# Patient Record
Sex: MALE | Race: WHITE | NOT HISPANIC OR LATINO | Employment: FULL TIME | URBAN - METROPOLITAN AREA
[De-identification: names, ages, dates, MRNs, and addresses within clinical notes are randomized per-mention and may not be internally consistent; named-entity substitution may affect disease eponyms.]

---

## 2019-01-01 ENCOUNTER — EXTERNAL RECORD (OUTPATIENT)
Dept: OTHER | Age: 64
End: 2019-01-01

## 2019-07-25 ENCOUNTER — TELEPHONE (OUTPATIENT)
Dept: SCHEDULING | Age: 64
End: 2019-07-25

## 2019-08-09 ENCOUNTER — OFFICE VISIT (OUTPATIENT)
Dept: PAIN MANAGEMENT | Age: 64
End: 2019-08-09

## 2019-08-09 DIAGNOSIS — M47.816 LUMBAR SPONDYLOSIS: Primary | ICD-10-CM

## 2019-08-09 PROCEDURE — 99214 OFFICE O/P EST MOD 30 MIN: CPT | Performed by: ANESTHESIOLOGY

## 2019-08-09 RX ORDER — LOSARTAN POTASSIUM 100 MG/1
TABLET ORAL
COMMUNITY
Start: 2019-04-13

## 2019-08-09 RX ORDER — ATORVASTATIN CALCIUM 20 MG/1
TABLET, FILM COATED ORAL
COMMUNITY
Start: 2019-04-13

## 2019-08-09 RX ORDER — MELOXICAM 15 MG/1
TABLET ORAL
COMMUNITY
End: 2019-09-09

## 2019-08-09 RX ORDER — CELECOXIB 200 MG/1
200 CAPSULE ORAL DAILY
Qty: 30 CAPSULE | Refills: 1 | Status: SHIPPED | OUTPATIENT
Start: 2019-08-09 | End: 2019-10-13 | Stop reason: SDUPTHER

## 2019-08-09 RX ORDER — AMLODIPINE BESYLATE 5 MG/1
5 TABLET ORAL DAILY
COMMUNITY

## 2019-08-09 ASSESSMENT — ENCOUNTER SYMPTOMS
BACK PAIN: 1
NEUROLOGICAL NEGATIVE: 1
ALLERGIC/IMMUNOLOGIC NEGATIVE: 1
PSYCHIATRIC NEGATIVE: 1
EYES NEGATIVE: 1
RESPIRATORY NEGATIVE: 1
ENDOCRINE NEGATIVE: 1
GASTROINTESTINAL NEGATIVE: 1
CONSTITUTIONAL NEGATIVE: 1
HEMATOLOGIC/LYMPHATIC NEGATIVE: 1

## 2019-08-09 ASSESSMENT — PAIN SCALES - GENERAL: PAINLEVEL: 5-6

## 2019-08-28 ENCOUNTER — APPOINTMENT (OUTPATIENT)
Dept: PAIN MANAGEMENT | Age: 64
End: 2019-08-28

## 2019-08-28 ASSESSMENT — ENCOUNTER SYMPTOMS
ENDOCRINE NEGATIVE: 1
EYES NEGATIVE: 1
GASTROINTESTINAL NEGATIVE: 1
HEMATOLOGIC/LYMPHATIC NEGATIVE: 1
NEUROLOGICAL NEGATIVE: 1
BACK PAIN: 1
RESPIRATORY NEGATIVE: 1
ALLERGIC/IMMUNOLOGIC NEGATIVE: 1
PSYCHIATRIC NEGATIVE: 1
CONSTITUTIONAL NEGATIVE: 1

## 2019-09-09 ENCOUNTER — OFFICE VISIT (OUTPATIENT)
Dept: PAIN MANAGEMENT | Age: 64
End: 2019-09-09

## 2019-09-09 DIAGNOSIS — M47.816 LUMBAR SPONDYLOSIS: Primary | ICD-10-CM

## 2019-09-09 PROBLEM — M54.16 LUMBAR RADICULOPATHY: Status: ACTIVE | Noted: 2019-09-09

## 2019-09-09 PROCEDURE — 99213 OFFICE O/P EST LOW 20 MIN: CPT | Performed by: ANESTHESIOLOGY

## 2019-09-09 RX ORDER — ALPRAZOLAM 0.5 MG/1
1 TABLET ORAL PRN
COMMUNITY
Start: 2019-02-26

## 2019-09-09 RX ORDER — METHYLPREDNISOLONE 4 MG
TABLET, DOSE PACK ORAL
Qty: 21 TABLET | Refills: 0 | Status: SHIPPED | OUTPATIENT
Start: 2019-09-09 | End: 2019-11-01 | Stop reason: ALTCHOICE

## 2019-09-09 ASSESSMENT — ENCOUNTER SYMPTOMS
NEUROLOGICAL NEGATIVE: 1
RESPIRATORY NEGATIVE: 1
PSYCHIATRIC NEGATIVE: 1
BACK PAIN: 1
EYES NEGATIVE: 1
GASTROINTESTINAL NEGATIVE: 1
ENDOCRINE NEGATIVE: 1
HEMATOLOGIC/LYMPHATIC NEGATIVE: 1
ALLERGIC/IMMUNOLOGIC NEGATIVE: 1
CONSTITUTIONAL NEGATIVE: 1

## 2019-09-09 ASSESSMENT — PAIN SCALES - GENERAL: PAINLEVEL: 5-6

## 2019-10-01 PROCEDURE — 62323 NJX INTERLAMINAR LMBR/SAC: CPT | Performed by: ANESTHESIOLOGY

## 2019-10-02 ENCOUNTER — HOSPITAL (OUTPATIENT)
Dept: OTHER | Age: 64
End: 2019-10-02
Attending: ANESTHESIOLOGY

## 2019-10-13 DIAGNOSIS — M47.816 LUMBAR SPONDYLOSIS: ICD-10-CM

## 2019-10-14 RX ORDER — CELECOXIB 200 MG/1
CAPSULE ORAL
Qty: 30 CAPSULE | Refills: 1 | Status: SHIPPED | OUTPATIENT
Start: 2019-10-14 | End: 2019-12-12 | Stop reason: SDUPTHER

## 2019-10-16 ENCOUNTER — APPOINTMENT (OUTPATIENT)
Dept: PAIN MANAGEMENT | Age: 64
End: 2019-10-16

## 2019-11-01 ENCOUNTER — OFFICE VISIT (OUTPATIENT)
Dept: PAIN MANAGEMENT | Age: 64
End: 2019-11-01

## 2019-11-01 ENCOUNTER — TELEPHONE (OUTPATIENT)
Dept: PAIN MANAGEMENT | Age: 64
End: 2019-11-01

## 2019-11-01 DIAGNOSIS — M54.16 LUMBAR RADICULOPATHY: Primary | ICD-10-CM

## 2019-11-01 PROCEDURE — 99213 OFFICE O/P EST LOW 20 MIN: CPT | Performed by: ANESTHESIOLOGY

## 2019-11-01 RX ORDER — FLUTICASONE PROPIONATE 0.05 %
CREAM (GRAM) TOPICAL
Refills: 1 | COMMUNITY
Start: 2019-07-30 | End: 2020-09-14 | Stop reason: CLARIF

## 2019-11-01 RX ORDER — INDOMETHACIN 50 MG/1
50 CAPSULE ORAL PRN
Refills: 0 | COMMUNITY
Start: 2019-09-20 | End: 2020-09-14 | Stop reason: CLARIF

## 2019-11-01 RX ORDER — CLOBETASOL PROPIONATE 0.46 MG/ML
SOLUTION TOPICAL
COMMUNITY
Start: 2019-10-30 | End: 2020-09-14 | Stop reason: CLARIF

## 2019-11-01 RX ORDER — METHYLPREDNISOLONE 4 MG
TABLET, DOSE PACK ORAL
Qty: 21 TABLET | Refills: 0 | Status: SHIPPED | OUTPATIENT
Start: 2019-11-01 | End: 2019-11-11 | Stop reason: SDUPTHER

## 2019-11-01 RX ORDER — CYCLOBENZAPRINE HCL 5 MG
TABLET ORAL
Refills: 0 | COMMUNITY
Start: 2019-07-30 | End: 2020-09-14 | Stop reason: CLARIF

## 2019-11-01 ASSESSMENT — PAIN SCALES - GENERAL: PAINLEVEL: 7-8

## 2019-11-01 ASSESSMENT — ENCOUNTER SYMPTOMS: BACK PAIN: 1

## 2019-11-09 DIAGNOSIS — M47.816 LUMBAR SPONDYLOSIS: ICD-10-CM

## 2019-11-11 ENCOUNTER — TELEPHONE (OUTPATIENT)
Dept: SCHEDULING | Age: 64
End: 2019-11-11

## 2019-11-11 DIAGNOSIS — M54.16 LUMBAR RADICULOPATHY: ICD-10-CM

## 2019-11-11 RX ORDER — CELECOXIB 200 MG/1
CAPSULE ORAL
Qty: 30 CAPSULE | Refills: 1 | OUTPATIENT
Start: 2019-11-11

## 2019-11-12 RX ORDER — METHYLPREDNISOLONE 4 MG/1
TABLET ORAL
Qty: 1 PACKET | Refills: 0 | Status: SHIPPED | OUTPATIENT
Start: 2019-11-12 | End: 2020-09-14 | Stop reason: CLARIF

## 2019-11-16 ENCOUNTER — TELEPHONE (OUTPATIENT)
Dept: SCHEDULING | Age: 64
End: 2019-11-16

## 2019-11-19 ENCOUNTER — OFFICE VISIT (OUTPATIENT)
Dept: PAIN MANAGEMENT | Age: 64
End: 2019-11-19

## 2019-11-19 ENCOUNTER — PREP FOR CASE (OUTPATIENT)
Dept: PAIN MANAGEMENT | Age: 64
End: 2019-11-19

## 2019-11-19 VITALS
WEIGHT: 199 LBS | SYSTOLIC BLOOD PRESSURE: 162 MMHG | DIASTOLIC BLOOD PRESSURE: 101 MMHG | HEIGHT: 72 IN | HEART RATE: 81 BPM | BODY MASS INDEX: 26.95 KG/M2

## 2019-11-19 DIAGNOSIS — M54.16 LUMBAR RADICULOPATHY: Primary | ICD-10-CM

## 2019-11-19 DIAGNOSIS — M51.36 DDD (DEGENERATIVE DISC DISEASE), LUMBAR: Primary | ICD-10-CM

## 2019-11-19 PROCEDURE — 99214 OFFICE O/P EST MOD 30 MIN: CPT | Performed by: PHYSICIAN ASSISTANT

## 2019-12-09 ENCOUNTER — TELEPHONE (OUTPATIENT)
Dept: PAIN MANAGEMENT | Age: 64
End: 2019-12-09

## 2019-12-10 ENCOUNTER — TELEPHONE (OUTPATIENT)
Dept: PAIN MANAGEMENT | Age: 64
End: 2019-12-10

## 2019-12-12 ENCOUNTER — HOSPITAL ENCOUNTER (OUTPATIENT)
Dept: GENERAL RADIOLOGY | Age: 64
Discharge: HOME | End: 2019-12-12
Attending: ANESTHESIOLOGY

## 2019-12-12 ENCOUNTER — HOSPITAL ENCOUNTER (OUTPATIENT)
Dept: PAIN MANAGEMENT | Age: 64
Discharge: HOME | End: 2019-12-12
Attending: ANESTHESIOLOGY

## 2019-12-12 VITALS
OXYGEN SATURATION: 96 % | RESPIRATION RATE: 17 BRPM | DIASTOLIC BLOOD PRESSURE: 95 MMHG | TEMPERATURE: 98.8 F | HEART RATE: 102 BPM | SYSTOLIC BLOOD PRESSURE: 175 MMHG

## 2019-12-12 DIAGNOSIS — M47.816 LUMBAR SPONDYLOSIS: ICD-10-CM

## 2019-12-12 DIAGNOSIS — M54.16 LUMBAR RADICULOPATHY: ICD-10-CM

## 2019-12-12 DIAGNOSIS — R52 PAIN: ICD-10-CM

## 2019-12-12 PROCEDURE — 76000 FLUOROSCOPY <1 HR PHYS/QHP: CPT

## 2019-12-12 PROCEDURE — 10002801 HB RX 250 W/O HCPCS: Performed by: ANESTHESIOLOGY

## 2019-12-12 PROCEDURE — 13000067 HB PAIN MANAGEMENT GROUP 2

## 2019-12-12 PROCEDURE — 10002805 HB CONTRAST AGENT: Performed by: ANESTHESIOLOGY

## 2019-12-12 PROCEDURE — 62323 NJX INTERLAMINAR LMBR/SAC: CPT | Performed by: ANESTHESIOLOGY

## 2019-12-12 RX ORDER — CELECOXIB 200 MG/1
CAPSULE ORAL
Qty: 30 CAPSULE | Refills: 1 | Status: SHIPPED | OUTPATIENT
Start: 2019-12-12 | End: 2020-01-19 | Stop reason: SDUPTHER

## 2019-12-12 RX ORDER — BUPIVACAINE HYDROCHLORIDE 2.5 MG/ML
3 INJECTION, SOLUTION EPIDURAL; INFILTRATION; INTRACAUDAL ONCE
Status: COMPLETED | OUTPATIENT
Start: 2019-12-12 | End: 2019-12-12

## 2019-12-12 RX ORDER — METHYLPREDNISOLONE ACETATE 80 MG/ML
80 INJECTION, SUSPENSION INTRA-ARTICULAR; INTRALESIONAL; INTRAMUSCULAR; SOFT TISSUE ONCE
Status: COMPLETED | OUTPATIENT
Start: 2019-12-12 | End: 2019-12-12

## 2019-12-12 RX ADMIN — BUPIVACAINE HYDROCHLORIDE 7.5 MG: 2.5 INJECTION, SOLUTION EPIDURAL; INFILTRATION; INTRACAUDAL; PERINEURAL at 11:53

## 2019-12-12 RX ADMIN — METHYLPREDNISOLONE ACETATE 80 MG: 80 INJECTION, SUSPENSION INTRA-ARTICULAR; INTRALESIONAL; INTRAMUSCULAR; SOFT TISSUE at 11:54

## 2019-12-12 RX ADMIN — IOHEXOL 0.5 ML: 300 INJECTION, SOLUTION INTRAVENOUS at 11:54

## 2019-12-12 ASSESSMENT — PAIN SCALES - GENERAL
PAINLEVEL_OUTOF10: 9
PAINLEVEL_OUTOF10: 2

## 2019-12-18 ENCOUNTER — APPOINTMENT (OUTPATIENT)
Dept: PAIN MANAGEMENT | Age: 64
End: 2019-12-18

## 2020-01-01 ENCOUNTER — EXTERNAL RECORD (OUTPATIENT)
Dept: OTHER | Age: 65
End: 2020-01-01

## 2020-01-01 ENCOUNTER — EXTERNAL RECORD (OUTPATIENT)
Dept: HEALTH INFORMATION MANAGEMENT | Facility: OTHER | Age: 65
End: 2020-01-01

## 2020-01-03 ENCOUNTER — PREP FOR CASE (OUTPATIENT)
Dept: PAIN MANAGEMENT | Age: 65
End: 2020-01-03

## 2020-01-03 ENCOUNTER — OFFICE VISIT (OUTPATIENT)
Dept: PAIN MANAGEMENT | Age: 65
End: 2020-01-03

## 2020-01-03 DIAGNOSIS — M54.16 LUMBAR RADICULOPATHY: ICD-10-CM

## 2020-01-03 DIAGNOSIS — M51.36 DDD (DEGENERATIVE DISC DISEASE), LUMBAR: Primary | ICD-10-CM

## 2020-01-03 DIAGNOSIS — M54.16 LUMBAR RADICULOPATHY: Primary | ICD-10-CM

## 2020-01-03 PROCEDURE — 99214 OFFICE O/P EST MOD 30 MIN: CPT | Performed by: PHYSICIAN ASSISTANT

## 2020-01-03 ASSESSMENT — PAIN SCALES - GENERAL: PAINLEVEL: 1-2

## 2020-01-09 ENCOUNTER — HOSPITAL ENCOUNTER (OUTPATIENT)
Dept: PAIN MANAGEMENT | Age: 65
Discharge: HOME | End: 2020-01-09
Attending: ANESTHESIOLOGY

## 2020-01-09 ENCOUNTER — HOSPITAL ENCOUNTER (OUTPATIENT)
Dept: GENERAL RADIOLOGY | Age: 65
Discharge: HOME | End: 2020-01-09
Attending: ANESTHESIOLOGY

## 2020-01-09 VITALS
TEMPERATURE: 98.6 F | RESPIRATION RATE: 16 BRPM | OXYGEN SATURATION: 95 % | DIASTOLIC BLOOD PRESSURE: 95 MMHG | SYSTOLIC BLOOD PRESSURE: 150 MMHG | HEART RATE: 90 BPM

## 2020-01-09 DIAGNOSIS — M54.16 LUMBAR RADICULOPATHY: ICD-10-CM

## 2020-01-09 DIAGNOSIS — R52 PAIN: ICD-10-CM

## 2020-01-09 PROCEDURE — 10002805 HB CONTRAST AGENT: Performed by: ANESTHESIOLOGY

## 2020-01-09 PROCEDURE — 76000 FLUOROSCOPY <1 HR PHYS/QHP: CPT

## 2020-01-09 PROCEDURE — 64483 NJX AA&/STRD TFRM EPI L/S 1: CPT | Performed by: ANESTHESIOLOGY

## 2020-01-09 PROCEDURE — 10002801 HB RX 250 W/O HCPCS: Performed by: ANESTHESIOLOGY

## 2020-01-09 PROCEDURE — 13000067 HB PAIN MANAGEMENT GROUP 2

## 2020-01-09 RX ORDER — METHYLPREDNISOLONE ACETATE 80 MG/ML
80 INJECTION, SUSPENSION INTRA-ARTICULAR; INTRALESIONAL; INTRAMUSCULAR; SOFT TISSUE ONCE
Status: COMPLETED | OUTPATIENT
Start: 2020-01-09 | End: 2020-01-09

## 2020-01-09 RX ORDER — BUPIVACAINE HYDROCHLORIDE 2.5 MG/ML
2 INJECTION, SOLUTION EPIDURAL; INFILTRATION; INTRACAUDAL ONCE
Status: COMPLETED | OUTPATIENT
Start: 2020-01-09 | End: 2020-01-09

## 2020-01-09 RX ADMIN — METHYLPREDNISOLONE ACETATE 80 MG: 80 INJECTION, SUSPENSION INTRA-ARTICULAR; INTRALESIONAL; INTRAMUSCULAR; SOFT TISSUE at 10:18

## 2020-01-09 RX ADMIN — IOHEXOL 0.5 ML: 300 INJECTION, SOLUTION INTRAVENOUS at 10:18

## 2020-01-09 RX ADMIN — BUPIVACAINE HYDROCHLORIDE 5 MG: 2.5 INJECTION, SOLUTION EPIDURAL; INFILTRATION; INTRACAUDAL; PERINEURAL at 10:19

## 2020-01-09 ASSESSMENT — PAIN SCALES - GENERAL
PAINLEVEL_OUTOF10: 5
PAINLEVEL_OUTOF10: 0

## 2020-01-09 ASSESSMENT — LIFESTYLE VARIABLES
HOW OFTEN DO YOU HAVE A DRINK CONTAINING ALCOHOL: 4 OR MORE TIMES PER WEEK
HOW MANY STANDARD DRINKS CONTAINING ALCOHOL DO YOU HAVE ON A TYPICAL DAY: 0,1 OR 2
AUDIT-C TOTAL SCORE: 4

## 2020-01-13 ENCOUNTER — TELEPHONE (OUTPATIENT)
Dept: PAIN MANAGEMENT | Age: 65
End: 2020-01-13

## 2020-01-19 DIAGNOSIS — M47.816 LUMBAR SPONDYLOSIS: ICD-10-CM

## 2020-01-19 RX ORDER — CELECOXIB 200 MG/1
CAPSULE ORAL
Qty: 30 CAPSULE | Refills: 1 | Status: SHIPPED | OUTPATIENT
Start: 2020-01-19 | End: 2020-11-18 | Stop reason: SDUPTHER

## 2020-01-28 ENCOUNTER — OFFICE VISIT (OUTPATIENT)
Dept: PAIN MANAGEMENT | Age: 65
End: 2020-01-28

## 2020-01-28 DIAGNOSIS — M51.36 DDD (DEGENERATIVE DISC DISEASE), LUMBAR: Primary | ICD-10-CM

## 2020-01-28 DIAGNOSIS — M54.16 LUMBAR RADICULOPATHY: ICD-10-CM

## 2020-01-28 PROCEDURE — 99214 OFFICE O/P EST MOD 30 MIN: CPT | Performed by: PHYSICIAN ASSISTANT

## 2020-01-28 ASSESSMENT — PAIN SCALES - GENERAL: PAINLEVEL: 1-2

## 2020-02-17 ENCOUNTER — TELEPHONE (OUTPATIENT)
Dept: PAIN MANAGEMENT | Age: 65
End: 2020-02-17

## 2020-03-09 ENCOUNTER — TELEPHONE (OUTPATIENT)
Dept: PAIN MANAGEMENT | Age: 65
End: 2020-03-09

## 2020-03-09 PROBLEM — M54.16 LUMBAR RADICULOPATHY: Status: ACTIVE | Noted: 2019-09-09

## 2020-03-09 PROBLEM — M47.816 LUMBAR SPONDYLOSIS: Status: ACTIVE | Noted: 2019-08-09

## 2020-03-09 PROBLEM — M51.36 DDD (DEGENERATIVE DISC DISEASE), LUMBAR: Status: ACTIVE | Noted: 2019-11-19

## 2020-03-12 ENCOUNTER — ORDER TRANSCRIPTION (OUTPATIENT)
Dept: ADMINISTRATIVE | Facility: HOSPITAL | Age: 65
End: 2020-03-12

## 2020-03-12 VITALS — WEIGHT: 200 LBS | BODY MASS INDEX: 27.09 KG/M2 | HEIGHT: 72 IN

## 2020-03-12 DIAGNOSIS — M54.9 BACK PAIN: Primary | ICD-10-CM

## 2020-03-12 NOTE — IMAGING NOTE
Spoke with patient regarding arrival time, NPO status, LOS,  and med review. Must hold all anticoagulants, NSAIDs and herbal supplements in preparation for procedure.

## 2020-03-16 ENCOUNTER — HOSPITAL ENCOUNTER (OUTPATIENT)
Dept: GENERAL RADIOLOGY | Facility: HOSPITAL | Age: 65
Discharge: HOME | End: 2020-03-16
Attending: ORTHOPAEDIC SURGERY
Payer: MEDICARE

## 2020-03-16 ENCOUNTER — HOSPITAL ENCOUNTER (OUTPATIENT)
Dept: CT IMAGING | Facility: HOSPITAL | Age: 65
Discharge: HOME | End: 2020-03-16
Attending: ORTHOPAEDIC SURGERY
Payer: MEDICARE

## 2020-03-16 ENCOUNTER — HOSPITAL ENCOUNTER (OUTPATIENT)
Dept: GENERAL RADIOLOGY | Facility: HOSPITAL | Age: 65
End: 2020-03-16
Attending: ORTHOPAEDIC SURGERY
Payer: MEDICARE

## 2020-03-18 VITALS — HEIGHT: 72 IN | WEIGHT: 200 LBS | BODY MASS INDEX: 27.09 KG/M2

## 2020-03-30 NOTE — IMAGING NOTE
Spoke with patient about Myelogram scheduled for 03/31 wishes to cancel appointment. Xray aware procedure needs to be canceled.

## 2020-03-31 ENCOUNTER — HOSPITAL ENCOUNTER (OUTPATIENT)
Dept: CT IMAGING | Facility: HOSPITAL | Age: 65
Discharge: HOME | End: 2020-03-31
Attending: ORTHOPAEDIC SURGERY
Payer: MEDICARE

## 2020-03-31 ENCOUNTER — HOSPITAL ENCOUNTER (OUTPATIENT)
Dept: GENERAL RADIOLOGY | Facility: HOSPITAL | Age: 65
Discharge: HOME | End: 2020-03-31
Attending: ORTHOPAEDIC SURGERY
Payer: MEDICARE

## 2020-07-17 ENCOUNTER — TELEPHONE (OUTPATIENT)
Dept: PAIN MANAGEMENT | Age: 65
End: 2020-07-17

## 2020-07-17 DIAGNOSIS — M51.36 DDD (DEGENERATIVE DISC DISEASE), LUMBAR: Primary | ICD-10-CM

## 2020-08-13 PROBLEM — R73.01 IFG (IMPAIRED FASTING GLUCOSE): Status: ACTIVE | Noted: 2020-04-01

## 2020-08-24 ENCOUNTER — TELEPHONE (OUTPATIENT)
Dept: SCHEDULING | Age: 65
End: 2020-08-24

## 2020-09-03 ENCOUNTER — TELEPHONE (OUTPATIENT)
Dept: PAIN MANAGEMENT | Age: 65
End: 2020-09-03

## 2020-09-03 DIAGNOSIS — M54.16 LUMBAR RADICULOPATHY: Primary | ICD-10-CM

## 2020-09-14 ENCOUNTER — OFFICE VISIT (OUTPATIENT)
Dept: PAIN MANAGEMENT | Age: 65
End: 2020-09-14

## 2020-09-14 DIAGNOSIS — M51.36 DDD (DEGENERATIVE DISC DISEASE), LUMBAR: Primary | ICD-10-CM

## 2020-09-14 DIAGNOSIS — M47.816 LUMBAR SPONDYLOSIS: ICD-10-CM

## 2020-09-14 PROCEDURE — 99214 OFFICE O/P EST MOD 30 MIN: CPT | Performed by: PHYSICIAN ASSISTANT

## 2020-09-14 RX ORDER — FLUTICASONE PROPIONATE 50 MCG
SPRAY, SUSPENSION (ML) NASAL
COMMUNITY
End: 2022-06-01

## 2020-09-14 RX ORDER — METHYLPREDNISOLONE 4 MG/1
4 TABLET ORAL SEE ADMIN INSTRUCTIONS
Qty: 21 TABLET | Refills: 1 | Status: SHIPPED | OUTPATIENT
Start: 2020-09-14 | End: 2020-11-18 | Stop reason: CLARIF

## 2020-09-14 RX ORDER — IBUPROFEN 600 MG/1
600 TABLET ORAL 3 TIMES DAILY
COMMUNITY
Start: 2020-09-01 | End: 2022-05-03

## 2020-09-14 RX ORDER — NAPROXEN 500 MG/1
TABLET ORAL
COMMUNITY
End: 2022-02-24

## 2020-09-14 ASSESSMENT — PAIN SCALES - GENERAL: PAINLEVEL: 1-2

## 2020-09-21 ENCOUNTER — APPOINTMENT (OUTPATIENT)
Dept: PAIN MANAGEMENT | Age: 65
End: 2020-09-21

## 2020-10-21 ENCOUNTER — TELEPHONE (OUTPATIENT)
Dept: PAIN MANAGEMENT | Age: 65
End: 2020-10-21

## 2020-10-21 RX ORDER — METHYLPREDNISOLONE 4 MG/1
4 TABLET ORAL SEE ADMIN INSTRUCTIONS
Qty: 21 TABLET | Refills: 0 | Status: SHIPPED | OUTPATIENT
Start: 2020-10-21 | End: 2020-11-18 | Stop reason: CLARIF

## 2020-11-18 ENCOUNTER — OFFICE VISIT (OUTPATIENT)
Dept: PAIN MANAGEMENT | Age: 65
End: 2020-11-18

## 2020-11-18 DIAGNOSIS — M47.814 SPONDYLOSIS OF THORACIC REGION WITHOUT MYELOPATHY OR RADICULOPATHY: Primary | ICD-10-CM

## 2020-11-18 DIAGNOSIS — M51.36 DDD (DEGENERATIVE DISC DISEASE), LUMBAR: ICD-10-CM

## 2020-11-18 DIAGNOSIS — M47.816 LUMBAR SPONDYLOSIS: ICD-10-CM

## 2020-11-18 PROCEDURE — 99214 OFFICE O/P EST MOD 30 MIN: CPT | Performed by: PHYSICIAN ASSISTANT

## 2020-11-18 RX ORDER — CELECOXIB 200 MG/1
200 CAPSULE ORAL DAILY
Qty: 30 CAPSULE | Refills: 3 | Status: SHIPPED | OUTPATIENT
Start: 2020-11-18 | End: 2022-02-25 | Stop reason: SDUPTHER

## 2020-11-18 RX ORDER — DOXYCYCLINE HYCLATE 100 MG
100 TABLET ORAL 2 TIMES DAILY WITH MEALS
COMMUNITY
Start: 2020-11-12 | End: 2021-01-11

## 2020-11-18 ASSESSMENT — PAIN SCALES - GENERAL: PAINLEVEL: 1-2

## 2020-11-24 ENCOUNTER — TELEPHONE (OUTPATIENT)
Dept: PAIN MANAGEMENT | Age: 65
End: 2020-11-24

## 2020-12-21 PROBLEM — M47.814 SPONDYLOSIS OF THORACIC REGION WITHOUT MYELOPATHY OR RADICULOPATHY: Status: ACTIVE | Noted: 2020-11-18

## 2020-12-23 ENCOUNTER — TELEPHONE (OUTPATIENT)
Dept: PAIN MANAGEMENT | Age: 65
End: 2020-12-23

## 2020-12-28 ENCOUNTER — TELEPHONE (OUTPATIENT)
Dept: SCHEDULING | Age: 65
End: 2020-12-28

## 2020-12-28 RX ORDER — METHYLPREDNISOLONE 4 MG/1
4 TABLET ORAL SEE ADMIN INSTRUCTIONS
Qty: 21 TABLET | Refills: 0 | Status: SHIPPED | OUTPATIENT
Start: 2020-12-28 | End: 2022-02-02

## 2021-01-01 ENCOUNTER — EXTERNAL RECORD (OUTPATIENT)
Dept: HEALTH INFORMATION MANAGEMENT | Facility: OTHER | Age: 66
End: 2021-01-01

## 2021-01-05 ENCOUNTER — APPOINTMENT (OUTPATIENT)
Dept: PAIN MANAGEMENT | Age: 66
End: 2021-01-05

## 2021-01-06 ENCOUNTER — APPOINTMENT (OUTPATIENT)
Dept: PAIN MANAGEMENT | Age: 66
End: 2021-01-06

## 2021-01-07 ENCOUNTER — APPOINTMENT (OUTPATIENT)
Dept: PAIN MANAGEMENT | Age: 66
End: 2021-01-07

## 2021-01-11 ENCOUNTER — OFFICE VISIT (OUTPATIENT)
Dept: PAIN MANAGEMENT | Age: 66
End: 2021-01-11

## 2021-01-11 ENCOUNTER — TELEPHONE (OUTPATIENT)
Dept: PAIN MANAGEMENT | Age: 66
End: 2021-01-11

## 2021-01-11 DIAGNOSIS — M54.16 LUMBAR RADICULOPATHY: Primary | ICD-10-CM

## 2021-01-11 PROCEDURE — 99214 OFFICE O/P EST MOD 30 MIN: CPT | Performed by: ANESTHESIOLOGY

## 2021-01-11 RX ORDER — COLCHICINE 0.6 MG/1
0.6 CAPSULE ORAL 3 TIMES DAILY
COMMUNITY
Start: 2020-12-07 | End: 2022-05-03

## 2021-01-11 RX ORDER — METHYLPREDNISOLONE 4 MG
TABLET, DOSE PACK ORAL
Qty: 21 TABLET | Refills: 0 | Status: SHIPPED | OUTPATIENT
Start: 2021-01-11 | End: 2022-02-02

## 2021-01-11 RX ORDER — DICLOFENAC SODIUM 75 MG/1
75 TABLET, DELAYED RELEASE ORAL 2 TIMES DAILY
Qty: 60 TABLET | Refills: 1 | Status: SHIPPED | OUTPATIENT
Start: 2021-01-11 | End: 2021-03-24

## 2021-01-11 RX ORDER — ALLOPURINOL 300 MG/1
300 TABLET ORAL AT BEDTIME
COMMUNITY
Start: 2020-12-21

## 2021-01-11 RX ORDER — DOXYCYCLINE HYCLATE 100 MG/1
100 CAPSULE ORAL 2 TIMES DAILY WITH MEALS
COMMUNITY
Start: 2020-12-22 | End: 2022-02-02

## 2021-01-11 ASSESSMENT — PAIN SCALES - GENERAL: PAINLEVEL: 1-2

## 2021-01-13 ENCOUNTER — TELEPHONE (OUTPATIENT)
Dept: PAIN MANAGEMENT | Age: 66
End: 2021-01-13

## 2021-01-21 ENCOUNTER — TELEPHONE (OUTPATIENT)
Dept: PAIN MANAGEMENT | Age: 66
End: 2021-01-21

## 2021-01-21 ENCOUNTER — E-ADVICE (OUTPATIENT)
Dept: PAIN MANAGEMENT | Age: 66
End: 2021-01-21

## 2021-01-22 ENCOUNTER — APPOINTMENT (OUTPATIENT)
Dept: PAIN MANAGEMENT | Age: 66
End: 2021-01-22

## 2021-01-26 ASSESSMENT — ENCOUNTER SYMPTOMS: BACK PAIN: 1

## 2021-03-09 ENCOUNTER — TELEPHONE (OUTPATIENT)
Dept: PAIN MANAGEMENT | Age: 66
End: 2021-03-09

## 2021-03-10 ENCOUNTER — APPOINTMENT (OUTPATIENT)
Dept: PAIN MANAGEMENT | Age: 66
End: 2021-03-10

## 2021-03-24 DIAGNOSIS — M54.16 LUMBAR RADICULOPATHY: ICD-10-CM

## 2021-03-24 RX ORDER — DICLOFENAC SODIUM 75 MG/1
TABLET, DELAYED RELEASE ORAL
Qty: 60 TABLET | Refills: 1 | Status: SHIPPED | OUTPATIENT
Start: 2021-03-24 | End: 2022-02-24

## 2021-05-18 ENCOUNTER — TELEPHONE (OUTPATIENT)
Dept: PAIN MANAGEMENT | Age: 66
End: 2021-05-18

## 2021-05-26 VITALS
HEIGHT: 72 IN | DIASTOLIC BLOOD PRESSURE: 93 MMHG | SYSTOLIC BLOOD PRESSURE: 146 MMHG | BODY MASS INDEX: 27.08 KG/M2 | SYSTOLIC BLOOD PRESSURE: 149 MMHG | HEART RATE: 80 BPM | SYSTOLIC BLOOD PRESSURE: 158 MMHG | HEART RATE: 92 BPM | SYSTOLIC BLOOD PRESSURE: 150 MMHG | WEIGHT: 199 LBS | SYSTOLIC BLOOD PRESSURE: 152 MMHG | DIASTOLIC BLOOD PRESSURE: 105 MMHG | TEMPERATURE: 98.4 F | HEIGHT: 72 IN | DIASTOLIC BLOOD PRESSURE: 93 MMHG | TEMPERATURE: 97.9 F | OXYGEN SATURATION: 97 % | WEIGHT: 200 LBS | DIASTOLIC BLOOD PRESSURE: 88 MMHG | BODY MASS INDEX: 26.95 KG/M2 | WEIGHT: 199.96 LBS | SYSTOLIC BLOOD PRESSURE: 142 MMHG | HEIGHT: 72 IN | SYSTOLIC BLOOD PRESSURE: 160 MMHG | HEIGHT: 72 IN | BODY MASS INDEX: 27.08 KG/M2 | BODY MASS INDEX: 26.95 KG/M2 | HEART RATE: 85 BPM | WEIGHT: 199.96 LBS | HEART RATE: 85 BPM | HEIGHT: 72 IN | OXYGEN SATURATION: 99 % | DIASTOLIC BLOOD PRESSURE: 86 MMHG | WEIGHT: 199 LBS | HEART RATE: 115 BPM | WEIGHT: 200 LBS | WEIGHT: 199.96 LBS | HEART RATE: 86 BPM | DIASTOLIC BLOOD PRESSURE: 102 MMHG | DIASTOLIC BLOOD PRESSURE: 88 MMHG | HEIGHT: 72 IN | BODY MASS INDEX: 27.08 KG/M2 | HEART RATE: 85 BPM | BODY MASS INDEX: 27.09 KG/M2 | HEIGHT: 72 IN | BODY MASS INDEX: 27.09 KG/M2 | OXYGEN SATURATION: 95 % | SYSTOLIC BLOOD PRESSURE: 156 MMHG | HEART RATE: 82 BPM | DIASTOLIC BLOOD PRESSURE: 89 MMHG

## 2022-01-01 ENCOUNTER — EXTERNAL RECORD (OUTPATIENT)
Dept: OTHER | Age: 67
End: 2022-01-01

## 2022-01-12 PROBLEM — M47.816 ARTHRITIS OF FACET JOINT OF LUMBAR SPINE: Status: ACTIVE | Noted: 2022-01-12

## 2022-01-12 PROBLEM — M54.14 THORACIC RADICULITIS: Status: ACTIVE | Noted: 2022-01-12

## 2022-01-12 PROBLEM — Z98.890 HISTORY OF LUMBAR LAMINECTOMY: Status: ACTIVE | Noted: 2022-01-12

## 2022-01-12 PROBLEM — M51.24 THORACIC DISC HERNIATION: Status: ACTIVE | Noted: 2022-01-12

## 2022-01-12 PROBLEM — M48.061 SPINAL STENOSIS OF LUMBAR REGION, UNSPECIFIED WHETHER NEUROGENIC CLAUDICATION PRESENT: Status: ACTIVE | Noted: 2022-01-12

## 2022-02-02 ENCOUNTER — OFFICE VISIT (OUTPATIENT)
Dept: PAIN MANAGEMENT | Age: 67
End: 2022-02-02

## 2022-02-02 ENCOUNTER — PREP FOR CASE (OUTPATIENT)
Dept: PAIN MANAGEMENT | Age: 67
End: 2022-02-02

## 2022-02-02 VITALS
HEIGHT: 72 IN | SYSTOLIC BLOOD PRESSURE: 136 MMHG | HEART RATE: 81 BPM | BODY MASS INDEX: 27.08 KG/M2 | DIASTOLIC BLOOD PRESSURE: 90 MMHG | WEIGHT: 199.96 LBS

## 2022-02-02 DIAGNOSIS — M47.816 LUMBAR SPONDYLOSIS: ICD-10-CM

## 2022-02-02 DIAGNOSIS — M54.14 THORACIC RADICULOPATHY: ICD-10-CM

## 2022-02-02 DIAGNOSIS — M47.816 LUMBAR SPONDYLOSIS: Primary | ICD-10-CM

## 2022-02-02 DIAGNOSIS — M51.36 DDD (DEGENERATIVE DISC DISEASE), LUMBAR: Primary | ICD-10-CM

## 2022-02-02 PROCEDURE — 99214 OFFICE O/P EST MOD 30 MIN: CPT | Performed by: ANESTHESIOLOGY

## 2022-02-02 RX ORDER — CLINDAMYCIN PHOSPHATE 11.9 MG/ML
SOLUTION TOPICAL
COMMUNITY
Start: 2021-12-08 | End: 2022-02-24 | Stop reason: SDUPTHER

## 2022-02-02 RX ORDER — BENZOYL PEROXIDE 100 MG/ML
LIQUID TOPICAL
COMMUNITY
Start: 2022-01-11

## 2022-02-02 RX ORDER — MINOCYCLINE HYDROCHLORIDE 100 MG/1
100 TABLET ORAL 2 TIMES DAILY
COMMUNITY
Start: 2021-12-08 | End: 2022-05-03

## 2022-02-02 RX ORDER — CICLOPIROX 1 G/100ML
SHAMPOO TOPICAL
COMMUNITY
Start: 2021-09-21 | End: 2022-05-03 | Stop reason: CLARIF

## 2022-02-02 RX ORDER — KETOCONAZOLE 20 MG/ML
SHAMPOO TOPICAL
COMMUNITY
Start: 2022-01-11 | End: 2022-05-03

## 2022-02-02 RX ORDER — HYDROXYCHLOROQUINE SULFATE 200 MG/1
TABLET, FILM COATED ORAL
COMMUNITY
Start: 2022-01-17 | End: 2022-02-24

## 2022-02-02 RX ORDER — CLINDAMYCIN PHOSPHATE 11.9 MG/ML
SOLUTION TOPICAL 2 TIMES DAILY
COMMUNITY
Start: 2021-09-29 | End: 2022-05-03

## 2022-02-02 RX ORDER — CLOBETASOL PROPIONATE 0.5 MG/G
OINTMENT TOPICAL
COMMUNITY
Start: 2021-10-28 | End: 2022-05-03

## 2022-02-02 ASSESSMENT — PAIN SCALES - GENERAL: PAINLEVEL: 1

## 2022-02-02 ASSESSMENT — ENCOUNTER SYMPTOMS: BACK PAIN: 1

## 2022-02-04 ENCOUNTER — TELEPHONE (OUTPATIENT)
Dept: PAIN MANAGEMENT | Age: 67
End: 2022-02-04

## 2022-02-04 RX ORDER — CYCLOBENZAPRINE HCL 10 MG
10 TABLET ORAL
Qty: 30 TABLET | Refills: 0 | Status: SHIPPED | OUTPATIENT
Start: 2022-02-04 | End: 2022-12-16 | Stop reason: SDUPTHER

## 2022-02-05 ENCOUNTER — TELEPHONE (OUTPATIENT)
Dept: SCHEDULING | Age: 67
End: 2022-02-05

## 2022-02-05 RX ORDER — CYCLOBENZAPRINE HCL 10 MG
10 TABLET ORAL
Qty: 30 TABLET | Refills: 0 | Status: CANCELLED | OUTPATIENT
Start: 2022-02-05

## 2022-02-21 ENCOUNTER — TELEPHONE (OUTPATIENT)
Dept: PAIN MANAGEMENT | Age: 67
End: 2022-02-21

## 2022-02-23 ENCOUNTER — TELEPHONE (OUTPATIENT)
Dept: PAIN MANAGEMENT | Age: 67
End: 2022-02-23

## 2022-02-24 ENCOUNTER — HOSPITAL ENCOUNTER (OUTPATIENT)
Dept: GENERAL RADIOLOGY | Age: 67
Discharge: HOME | End: 2022-02-24
Attending: ANESTHESIOLOGY

## 2022-02-24 ENCOUNTER — HOSPITAL ENCOUNTER (OUTPATIENT)
Dept: PAIN MANAGEMENT | Age: 67
Discharge: HOME | End: 2022-02-24
Attending: ANESTHESIOLOGY

## 2022-02-24 VITALS
OXYGEN SATURATION: 98 % | SYSTOLIC BLOOD PRESSURE: 153 MMHG | TEMPERATURE: 99 F | RESPIRATION RATE: 12 BRPM | DIASTOLIC BLOOD PRESSURE: 86 MMHG | HEART RATE: 69 BPM

## 2022-02-24 DIAGNOSIS — R52 PAIN: ICD-10-CM

## 2022-02-24 DIAGNOSIS — M47.816 LUMBAR SPONDYLOSIS: ICD-10-CM

## 2022-02-24 PROCEDURE — 13000068 HB PAIN MANAGEMENT GROUP 3

## 2022-02-24 PROCEDURE — 64493 INJ PARAVERT F JNT L/S 1 LEV: CPT | Performed by: ANESTHESIOLOGY

## 2022-02-24 PROCEDURE — 62321 NJX INTERLAMINAR CRV/THRC: CPT | Performed by: ANESTHESIOLOGY

## 2022-02-24 PROCEDURE — 10002805 HB CONTRAST AGENT: Performed by: ANESTHESIOLOGY

## 2022-02-24 PROCEDURE — 76000 FLUOROSCOPY <1 HR PHYS/QHP: CPT

## 2022-02-24 PROCEDURE — 64494 INJ PARAVERT F JNT L/S 2 LEV: CPT | Performed by: ANESTHESIOLOGY

## 2022-02-24 PROCEDURE — 64495 INJ PARAVERT F JNT L/S 3 LEV: CPT | Performed by: ANESTHESIOLOGY

## 2022-02-24 PROCEDURE — 10002801 HB RX 250 W/O HCPCS: Performed by: ANESTHESIOLOGY

## 2022-02-24 RX ORDER — MELOXICAM 15 MG/1
15 TABLET ORAL DAILY
COMMUNITY
End: 2022-05-03

## 2022-02-24 RX ORDER — BUPIVACAINE HYDROCHLORIDE 2.5 MG/ML
4.5 INJECTION, SOLUTION EPIDURAL; INFILTRATION; INTRACAUDAL ONCE
Status: COMPLETED | OUTPATIENT
Start: 2022-02-24 | End: 2022-02-24

## 2022-02-24 RX ORDER — METHYLPREDNISOLONE ACETATE 80 MG/ML
80 INJECTION, SUSPENSION INTRA-ARTICULAR; INTRALESIONAL; INTRAMUSCULAR; SOFT TISSUE ONCE
Status: COMPLETED | OUTPATIENT
Start: 2022-02-24 | End: 2022-02-24

## 2022-02-24 RX ORDER — BUPIVACAINE HYDROCHLORIDE 2.5 MG/ML
3 INJECTION, SOLUTION EPIDURAL; INFILTRATION; INTRACAUDAL ONCE
Status: DISCONTINUED | OUTPATIENT
Start: 2022-02-24 | End: 2022-02-24

## 2022-02-24 RX ADMIN — IOHEXOL 0.5 ML: 300 INJECTION, SOLUTION INTRAVENOUS at 13:38

## 2022-02-24 RX ADMIN — METHYLPREDNISOLONE ACETATE 80 MG: 80 INJECTION, SUSPENSION INTRA-ARTICULAR; INTRALESIONAL; INTRAMUSCULAR; SOFT TISSUE at 13:39

## 2022-02-24 RX ADMIN — BUPIVACAINE HYDROCHLORIDE 11.25 MG: 2.5 INJECTION, SOLUTION EPIDURAL; INFILTRATION; INTRACAUDAL; PERINEURAL at 13:37

## 2022-02-24 RX ADMIN — IOHEXOL 0.5 ML: 300 INJECTION, SOLUTION INTRAVENOUS at 13:43

## 2022-02-24 ASSESSMENT — PAIN SCALES - GENERAL
PAINLEVEL_OUTOF10: 2
PAINLEVEL_OUTOF10: 0

## 2022-02-25 ENCOUNTER — PREP FOR CASE (OUTPATIENT)
Dept: PAIN MANAGEMENT | Age: 67
End: 2022-02-25

## 2022-02-25 ENCOUNTER — TELEPHONE (OUTPATIENT)
Dept: PAIN MANAGEMENT | Age: 67
End: 2022-02-25

## 2022-02-25 DIAGNOSIS — M47.816 LUMBAR SPONDYLOSIS: ICD-10-CM

## 2022-02-25 DIAGNOSIS — M47.816 LUMBAR SPONDYLOSIS: Primary | ICD-10-CM

## 2022-02-25 RX ORDER — CELECOXIB 200 MG/1
200 CAPSULE ORAL DAILY
Qty: 30 CAPSULE | Refills: 3 | Status: SHIPPED | OUTPATIENT
Start: 2022-02-25 | End: 2022-07-11

## 2022-02-28 ENCOUNTER — TELEPHONE (OUTPATIENT)
Dept: PAIN MANAGEMENT | Age: 67
End: 2022-02-28

## 2022-03-12 DIAGNOSIS — M47.816 LUMBAR SPONDYLOSIS: ICD-10-CM

## 2022-03-14 RX ORDER — CELECOXIB 200 MG/1
CAPSULE ORAL
Qty: 30 CAPSULE | Refills: 0 | OUTPATIENT
Start: 2022-03-14

## 2022-03-19 PROBLEM — Z87.19 HISTORY OF RECTAL FISSURE: Status: ACTIVE | Noted: 2022-01-01

## 2022-03-23 ENCOUNTER — TELEPHONE (OUTPATIENT)
Dept: PAIN MANAGEMENT | Age: 67
End: 2022-03-23

## 2022-03-24 ENCOUNTER — HOSPITAL ENCOUNTER (OUTPATIENT)
Dept: PAIN MANAGEMENT | Age: 67
Discharge: HOME | End: 2022-03-24
Attending: ANESTHESIOLOGY

## 2022-03-24 ENCOUNTER — HOSPITAL ENCOUNTER (OUTPATIENT)
Dept: GENERAL RADIOLOGY | Age: 67
Discharge: HOME | End: 2022-03-24
Attending: ANESTHESIOLOGY

## 2022-03-24 VITALS
OXYGEN SATURATION: 99 % | DIASTOLIC BLOOD PRESSURE: 97 MMHG | RESPIRATION RATE: 16 BRPM | SYSTOLIC BLOOD PRESSURE: 167 MMHG | TEMPERATURE: 99 F | HEART RATE: 79 BPM

## 2022-03-24 DIAGNOSIS — R52 PAIN: ICD-10-CM

## 2022-03-24 DIAGNOSIS — M47.816 LUMBAR SPONDYLOSIS: ICD-10-CM

## 2022-03-24 PROCEDURE — 64495 INJ PARAVERT F JNT L/S 3 LEV: CPT | Performed by: ANESTHESIOLOGY

## 2022-03-24 PROCEDURE — 64493 INJ PARAVERT F JNT L/S 1 LEV: CPT | Performed by: ANESTHESIOLOGY

## 2022-03-24 PROCEDURE — 10002801 HB RX 250 W/O HCPCS: Performed by: ANESTHESIOLOGY

## 2022-03-24 PROCEDURE — 76000 FLUOROSCOPY <1 HR PHYS/QHP: CPT

## 2022-03-24 PROCEDURE — 64494 INJ PARAVERT F JNT L/S 2 LEV: CPT | Performed by: ANESTHESIOLOGY

## 2022-03-24 PROCEDURE — 62321 NJX INTERLAMINAR CRV/THRC: CPT | Performed by: ANESTHESIOLOGY

## 2022-03-24 PROCEDURE — 10002805 HB CONTRAST AGENT: Performed by: ANESTHESIOLOGY

## 2022-03-24 PROCEDURE — 13000068 HB PAIN MANAGEMENT GROUP 3

## 2022-03-24 RX ORDER — METHYLPREDNISOLONE ACETATE 80 MG/ML
80 INJECTION, SUSPENSION INTRA-ARTICULAR; INTRALESIONAL; INTRAMUSCULAR; SOFT TISSUE ONCE
Status: COMPLETED | OUTPATIENT
Start: 2022-03-24 | End: 2022-03-24

## 2022-03-24 RX ORDER — BUPIVACAINE HYDROCHLORIDE 2.5 MG/ML
1.5 INJECTION, SOLUTION EPIDURAL; INFILTRATION; INTRACAUDAL ONCE
Status: COMPLETED | OUTPATIENT
Start: 2022-03-24 | End: 2022-03-24

## 2022-03-24 RX ORDER — BUPIVACAINE HYDROCHLORIDE 2.5 MG/ML
3 INJECTION, SOLUTION EPIDURAL; INFILTRATION; INTRACAUDAL ONCE
Status: COMPLETED | OUTPATIENT
Start: 2022-03-24 | End: 2022-03-24

## 2022-03-24 RX ORDER — METHYLPREDNISOLONE ACETATE 80 MG/ML
80 INJECTION, SUSPENSION INTRA-ARTICULAR; INTRALESIONAL; INTRAMUSCULAR; SOFT TISSUE ONCE
Status: DISCONTINUED | OUTPATIENT
Start: 2022-03-24 | End: 2022-03-24

## 2022-03-24 RX ADMIN — BUPIVACAINE HYDROCHLORIDE 3.75 MG: 2.5 INJECTION, SOLUTION EPIDURAL; INFILTRATION; INTRACAUDAL; PERINEURAL at 14:02

## 2022-03-24 RX ADMIN — METHYLPREDNISOLONE ACETATE 80 MG: 80 INJECTION, SUSPENSION INTRA-ARTICULAR; INTRALESIONAL; INTRAMUSCULAR; SOFT TISSUE at 14:01

## 2022-03-24 RX ADMIN — BUPIVACAINE HYDROCHLORIDE 7.5 MG: 2.5 INJECTION, SOLUTION EPIDURAL; INFILTRATION; INTRACAUDAL; PERINEURAL at 13:59

## 2022-03-24 RX ADMIN — IOHEXOL 0.5 ML: 300 INJECTION, SOLUTION INTRAVENOUS at 13:59

## 2022-03-24 RX ADMIN — IOHEXOL 0.5 ML: 300 INJECTION, SOLUTION INTRAVENOUS at 14:03

## 2022-03-24 ASSESSMENT — PAIN SCALES - GENERAL
PAINLEVEL_OUTOF10: 1
PAINLEVEL_OUTOF10: 3

## 2022-03-25 ENCOUNTER — TELEPHONE (OUTPATIENT)
Dept: PAIN MANAGEMENT | Age: 67
End: 2022-03-25

## 2022-03-25 DIAGNOSIS — Z01.812 PRE-PROCEDURAL LABORATORY EXAMINATION: Primary | ICD-10-CM

## 2022-03-28 ENCOUNTER — PREP FOR CASE (OUTPATIENT)
Dept: PAIN MANAGEMENT | Age: 67
End: 2022-03-28

## 2022-03-28 DIAGNOSIS — M47.816 LUMBAR SPONDYLOSIS: Primary | ICD-10-CM

## 2022-04-12 ENCOUNTER — APPOINTMENT (OUTPATIENT)
Dept: CARDIOLOGY | Age: 67
End: 2022-04-12
Attending: ANESTHESIOLOGY

## 2022-04-19 ENCOUNTER — PREP FOR CASE (OUTPATIENT)
Dept: PAIN MANAGEMENT | Age: 67
End: 2022-04-19

## 2022-04-19 DIAGNOSIS — M47.816 LUMBAR SPONDYLOSIS: Primary | ICD-10-CM

## 2022-04-28 ENCOUNTER — TELEPHONE (OUTPATIENT)
Dept: CARDIOLOGY | Age: 67
End: 2022-04-28

## 2022-05-01 ENCOUNTER — LAB SERVICES (OUTPATIENT)
Dept: LAB | Age: 67
End: 2022-05-01

## 2022-05-01 DIAGNOSIS — Z01.812 PRE-PROCEDURAL LABORATORY EXAMINATION: ICD-10-CM

## 2022-05-01 LAB
SARS-COV-2 RNA RESP QL NAA+PROBE: NOT DETECTED
SERVICE CMNT-IMP: NORMAL
SERVICE CMNT-IMP: NORMAL

## 2022-05-01 PROCEDURE — U0005 INFEC AGEN DETEC AMPLI PROBE: HCPCS

## 2022-05-02 ENCOUNTER — TELEPHONE (OUTPATIENT)
Dept: PAIN MANAGEMENT | Age: 67
End: 2022-05-02

## 2022-05-03 ENCOUNTER — HOSPITAL ENCOUNTER (OUTPATIENT)
Dept: CARDIOLOGY | Age: 67
Discharge: HOME | End: 2022-05-03
Attending: ANESTHESIOLOGY

## 2022-05-03 VITALS
DIASTOLIC BLOOD PRESSURE: 101 MMHG | HEART RATE: 85 BPM | SYSTOLIC BLOOD PRESSURE: 156 MMHG | BODY MASS INDEX: 27.09 KG/M2 | WEIGHT: 200 LBS | OXYGEN SATURATION: 94 % | TEMPERATURE: 98 F | RESPIRATION RATE: 13 BRPM | HEIGHT: 72 IN

## 2022-05-03 DIAGNOSIS — R52 PAIN: ICD-10-CM

## 2022-05-03 PROCEDURE — C2618 PROBE/NEEDLE, CRYO: HCPCS

## 2022-05-03 PROCEDURE — 64636 DESTROY L/S FACET JNT ADDL: CPT | Performed by: ANESTHESIOLOGY

## 2022-05-03 PROCEDURE — 99152 MOD SED SAME PHYS/QHP 5/>YRS: CPT

## 2022-05-03 PROCEDURE — 10006023 HB SUPPLY 272

## 2022-05-03 PROCEDURE — 99152 MOD SED SAME PHYS/QHP 5/>YRS: CPT | Performed by: ANESTHESIOLOGY

## 2022-05-03 PROCEDURE — 10002801 HB RX 250 W/O HCPCS: Performed by: ANESTHESIOLOGY

## 2022-05-03 PROCEDURE — 64635 DESTROY LUMB/SAC FACET JNT: CPT | Performed by: ANESTHESIOLOGY

## 2022-05-03 PROCEDURE — 76000 FLUOROSCOPY <1 HR PHYS/QHP: CPT

## 2022-05-03 PROCEDURE — 13000068 HB PAIN MANAGEMENT GROUP 3

## 2022-05-03 PROCEDURE — 10002800 HB RX 250 W HCPCS: Performed by: ANESTHESIOLOGY

## 2022-05-03 RX ORDER — BUPIVACAINE HYDROCHLORIDE 2.5 MG/ML
INJECTION, SOLUTION EPIDURAL; INFILTRATION; INTRACAUDAL PRN
Status: COMPLETED | OUTPATIENT
Start: 2022-05-03 | End: 2022-05-03

## 2022-05-03 RX ORDER — MIDAZOLAM HYDROCHLORIDE 1 MG/ML
INJECTION, SOLUTION INTRAMUSCULAR; INTRAVENOUS PRN
Status: COMPLETED | OUTPATIENT
Start: 2022-05-03 | End: 2022-05-03

## 2022-05-03 RX ORDER — METHYLPREDNISOLONE ACETATE 80 MG/ML
INJECTION, SUSPENSION INTRA-ARTICULAR; INTRALESIONAL; INTRAMUSCULAR; SOFT TISSUE PRN
Status: COMPLETED | OUTPATIENT
Start: 2022-05-03 | End: 2022-05-03

## 2022-05-03 RX ORDER — LIDOCAINE HYDROCHLORIDE 10 MG/ML
INJECTION, SOLUTION INFILTRATION; PERINEURAL PRN
Status: COMPLETED | OUTPATIENT
Start: 2022-05-03 | End: 2022-05-03

## 2022-05-03 RX ADMIN — FENTANYL CITRATE 50 MCG: 50 INJECTION, SOLUTION INTRAMUSCULAR; INTRAVENOUS at 14:08

## 2022-05-03 RX ADMIN — LIDOCAINE HYDROCHLORIDE 10 ML: 10 INJECTION, SOLUTION INFILTRATION; PERINEURAL at 14:16

## 2022-05-03 RX ADMIN — MIDAZOLAM HYDROCHLORIDE 1 MG: 1 INJECTION, SOLUTION INTRAMUSCULAR; INTRAVENOUS at 14:15

## 2022-05-03 RX ADMIN — MIDAZOLAM HYDROCHLORIDE 1 MG: 1 INJECTION, SOLUTION INTRAMUSCULAR; INTRAVENOUS at 14:25

## 2022-05-03 RX ADMIN — METHYLPREDNISOLONE ACETATE 80 MG: 80 INJECTION, SUSPENSION INTRA-ARTICULAR; INTRALESIONAL; INTRAMUSCULAR; SOFT TISSUE at 14:23

## 2022-05-03 RX ADMIN — MIDAZOLAM HYDROCHLORIDE 1 MG: 1 INJECTION, SOLUTION INTRAMUSCULAR; INTRAVENOUS at 14:09

## 2022-05-03 RX ADMIN — MIDAZOLAM HYDROCHLORIDE 3 MG: 1 INJECTION, SOLUTION INTRAMUSCULAR; INTRAVENOUS at 14:08

## 2022-05-03 RX ADMIN — MIDAZOLAM HYDROCHLORIDE 1 MG: 1 INJECTION, SOLUTION INTRAMUSCULAR; INTRAVENOUS at 14:21

## 2022-05-03 RX ADMIN — BUPIVACAINE HYDROCHLORIDE 10 ML: 2.5 INJECTION, SOLUTION EPIDURAL; INFILTRATION; INTRACAUDAL at 14:22

## 2022-05-03 RX ADMIN — FENTANYL CITRATE 50 MCG: 50 INJECTION, SOLUTION INTRAMUSCULAR; INTRAVENOUS at 14:15

## 2022-05-06 ENCOUNTER — TELEPHONE (OUTPATIENT)
Dept: PAIN MANAGEMENT | Age: 67
End: 2022-05-06

## 2022-05-27 ENCOUNTER — TELEPHONE (OUTPATIENT)
Dept: PAIN MANAGEMENT | Age: 67
End: 2022-05-27

## 2022-06-01 ENCOUNTER — OFFICE VISIT (OUTPATIENT)
Dept: PAIN MANAGEMENT | Age: 67
End: 2022-06-01

## 2022-06-01 VITALS
SYSTOLIC BLOOD PRESSURE: 154 MMHG | HEART RATE: 70 BPM | DIASTOLIC BLOOD PRESSURE: 85 MMHG | WEIGHT: 199 LBS | HEIGHT: 72 IN | BODY MASS INDEX: 26.95 KG/M2

## 2022-06-01 DIAGNOSIS — M47.816 LUMBAR SPONDYLOSIS: Primary | ICD-10-CM

## 2022-06-01 PROCEDURE — 99214 OFFICE O/P EST MOD 30 MIN: CPT | Performed by: ANESTHESIOLOGY

## 2022-06-01 ASSESSMENT — ENCOUNTER SYMPTOMS: BACK PAIN: 1

## 2022-06-01 ASSESSMENT — PAIN SCALES - GENERAL: PAINLEVEL: 1

## 2022-06-24 ENCOUNTER — TELEPHONE (OUTPATIENT)
Dept: SCHEDULING | Age: 67
End: 2022-06-24

## 2022-06-24 ENCOUNTER — TELEPHONE (OUTPATIENT)
Dept: PAIN MANAGEMENT | Age: 67
End: 2022-06-24

## 2022-07-06 ENCOUNTER — TELEPHONE (OUTPATIENT)
Dept: PAIN MANAGEMENT | Age: 67
End: 2022-07-06

## 2022-07-10 DIAGNOSIS — M47.816 LUMBAR SPONDYLOSIS: ICD-10-CM

## 2022-07-11 RX ORDER — CELECOXIB 200 MG/1
200 CAPSULE ORAL DAILY
Qty: 30 CAPSULE | Refills: 3 | Status: SHIPPED | OUTPATIENT
Start: 2022-07-11 | End: 2022-11-22

## 2022-09-14 ENCOUNTER — TELEPHONE (OUTPATIENT)
Dept: PAIN MANAGEMENT | Age: 67
End: 2022-09-14

## 2022-09-14 DIAGNOSIS — M54.16 LUMBAR RADICULOPATHY: Primary | ICD-10-CM

## 2022-09-16 ENCOUNTER — EXTERNAL RECORD (OUTPATIENT)
Dept: HEALTH INFORMATION MANAGEMENT | Facility: OTHER | Age: 67
End: 2022-09-16

## 2022-09-28 ENCOUNTER — E-ADVICE (OUTPATIENT)
Dept: PAIN MANAGEMENT | Age: 67
End: 2022-09-28

## 2022-09-28 DIAGNOSIS — Z01.812 PRE-PROCEDURAL LABORATORY EXAMINATIONS: Primary | ICD-10-CM

## 2022-10-01 ENCOUNTER — LAB SERVICES (OUTPATIENT)
Dept: LAB | Age: 67
End: 2022-10-01

## 2022-10-01 DIAGNOSIS — Z01.812 PRE-PROCEDURAL LABORATORY EXAMINATIONS: ICD-10-CM

## 2022-10-01 PROCEDURE — U0005 INFEC AGEN DETEC AMPLI PROBE: HCPCS

## 2022-10-02 ENCOUNTER — TELEPHONE (OUTPATIENT)
Dept: CARDIOLOGY | Age: 67
End: 2022-10-02

## 2022-10-02 LAB
SARS-COV-2 RNA RESP QL NAA+PROBE: NOT DETECTED
SERVICE CMNT-IMP: NORMAL
SERVICE CMNT-IMP: NORMAL

## 2022-10-03 ENCOUNTER — ANESTHESIA EVENT (OUTPATIENT)
Dept: MRI IMAGING | Age: 67
End: 2022-10-03

## 2022-10-03 ENCOUNTER — HOSPITAL ENCOUNTER (OUTPATIENT)
Dept: MRI IMAGING | Age: 67
Discharge: HOME | End: 2022-10-03
Attending: PHYSICIAN ASSISTANT

## 2022-10-03 ENCOUNTER — APPOINTMENT (OUTPATIENT)
Dept: MRI IMAGING | Age: 67
End: 2022-10-03
Attending: PHYSICIAN ASSISTANT

## 2022-10-03 ENCOUNTER — ANESTHESIA (OUTPATIENT)
Dept: MRI IMAGING | Age: 67
End: 2022-10-03

## 2022-10-03 VITALS
DIASTOLIC BLOOD PRESSURE: 84 MMHG | TEMPERATURE: 97.9 F | HEART RATE: 78 BPM | OXYGEN SATURATION: 96 % | SYSTOLIC BLOOD PRESSURE: 141 MMHG | RESPIRATION RATE: 18 BRPM

## 2022-10-03 DIAGNOSIS — M54.16 LUMBAR RADICULOPATHY: ICD-10-CM

## 2022-10-03 PROCEDURE — G1004 CDSM NDSC: HCPCS

## 2022-10-03 PROCEDURE — 13000001 HB PHASE II RECOVERY EA 30 MINUTES

## 2022-10-03 PROCEDURE — 10004451 HB PACU RECOVERY 1ST 30 MINUTES

## 2022-10-03 PROCEDURE — 72148 MRI LUMBAR SPINE W/O DYE: CPT

## 2022-10-03 PROCEDURE — 13000008 HB ANESTHESIA MAC OUTSIDE OR

## 2022-10-03 PROCEDURE — 10002801 HB RX 250 W/O HCPCS: Performed by: ANESTHESIOLOGY

## 2022-10-03 PROCEDURE — 10002807 HB RX 258: Performed by: ANESTHESIOLOGY

## 2022-10-03 PROCEDURE — 10002800 HB RX 250 W HCPCS: Performed by: ANESTHESIOLOGY

## 2022-10-03 RX ORDER — NALOXONE HCL 0.4 MG/ML
0.2 VIAL (ML) INJECTION EVERY 5 MIN PRN
Status: DISCONTINUED | OUTPATIENT
Start: 2022-10-03 | End: 2022-10-05 | Stop reason: HOSPADM

## 2022-10-03 RX ORDER — LIDOCAINE HYDROCHLORIDE 10 MG/ML
INJECTION, SOLUTION INFILTRATION; PERINEURAL PRN
Status: DISCONTINUED | OUTPATIENT
Start: 2022-10-03 | End: 2022-10-03

## 2022-10-03 RX ORDER — SODIUM CHLORIDE 9 MG/ML
INJECTION, SOLUTION INTRAVENOUS CONTINUOUS PRN
Status: DISCONTINUED | OUTPATIENT
Start: 2022-10-03 | End: 2022-10-03

## 2022-10-03 RX ORDER — PROPOFOL 10 MG/ML
INJECTION, EMULSION INTRAVENOUS PRN
Status: DISCONTINUED | OUTPATIENT
Start: 2022-10-03 | End: 2022-10-03

## 2022-10-03 RX ORDER — HYDRALAZINE HYDROCHLORIDE 20 MG/ML
5 INJECTION INTRAMUSCULAR; INTRAVENOUS EVERY 10 MIN PRN
Status: DISCONTINUED | OUTPATIENT
Start: 2022-10-03 | End: 2022-10-05 | Stop reason: HOSPADM

## 2022-10-03 RX ORDER — ONDANSETRON 2 MG/ML
4 INJECTION INTRAMUSCULAR; INTRAVENOUS 2 TIMES DAILY PRN
Status: DISCONTINUED | OUTPATIENT
Start: 2022-10-03 | End: 2022-10-05 | Stop reason: HOSPADM

## 2022-10-03 RX ORDER — MIDAZOLAM HYDROCHLORIDE 1 MG/ML
INJECTION, SOLUTION INTRAMUSCULAR; INTRAVENOUS PRN
Status: DISCONTINUED | OUTPATIENT
Start: 2022-10-03 | End: 2022-10-03

## 2022-10-03 RX ADMIN — PROPOFOL 150 MCG/KG/MIN: 10 INJECTION, EMULSION INTRAVENOUS at 13:12

## 2022-10-03 RX ADMIN — LIDOCAINE HYDROCHLORIDE 5 ML: 10 INJECTION, SOLUTION INFILTRATION; PERINEURAL at 13:10

## 2022-10-03 RX ADMIN — MIDAZOLAM HYDROCHLORIDE 2 MG: 1 INJECTION, SOLUTION INTRAMUSCULAR; INTRAVENOUS at 13:09

## 2022-10-03 RX ADMIN — PROPOFOL 50 MG: 10 INJECTION, EMULSION INTRAVENOUS at 13:10

## 2022-10-03 RX ADMIN — SODIUM CHLORIDE: 9 INJECTION, SOLUTION INTRAVENOUS at 13:10

## 2022-10-03 ASSESSMENT — PAIN SCALES - GENERAL
PAINLEVEL_OUTOF10: 0

## 2022-11-04 ENCOUNTER — CLINICAL ABSTRACT (OUTPATIENT)
Dept: HEALTH INFORMATION MANAGEMENT | Facility: OTHER | Age: 67
End: 2022-11-04

## 2022-11-07 ENCOUNTER — PREP FOR CASE (OUTPATIENT)
Dept: PAIN MANAGEMENT | Age: 67
End: 2022-11-07

## 2022-11-07 ENCOUNTER — OFFICE VISIT (OUTPATIENT)
Dept: PAIN MANAGEMENT | Age: 67
End: 2022-11-07

## 2022-11-07 VITALS
SYSTOLIC BLOOD PRESSURE: 142 MMHG | HEIGHT: 72 IN | DIASTOLIC BLOOD PRESSURE: 90 MMHG | BODY MASS INDEX: 26.96 KG/M2 | HEART RATE: 86 BPM | WEIGHT: 199.08 LBS

## 2022-11-07 DIAGNOSIS — M47.816 LUMBAR SPONDYLOSIS: ICD-10-CM

## 2022-11-07 DIAGNOSIS — M46.1 SACROILIITIS (CMD): Primary | ICD-10-CM

## 2022-11-07 DIAGNOSIS — M46.1 SACROILIITIS, NOT ELSEWHERE CLASSIFIED (CMD): Primary | ICD-10-CM

## 2022-11-07 PROCEDURE — 99214 OFFICE O/P EST MOD 30 MIN: CPT | Performed by: ANESTHESIOLOGY

## 2022-11-07 ASSESSMENT — PAIN SCALES - GENERAL: PAINLEVEL: 1

## 2022-11-08 ENCOUNTER — TELEPHONE (OUTPATIENT)
Dept: PAIN MANAGEMENT | Age: 67
End: 2022-11-08

## 2022-11-08 PROBLEM — M46.1 SACROILIITIS, NOT ELSEWHERE CLASSIFIED (CMD): Status: ACTIVE | Noted: 2022-11-08

## 2022-11-08 ASSESSMENT — ENCOUNTER SYMPTOMS: BACK PAIN: 1

## 2022-11-15 ENCOUNTER — HOSPITAL ENCOUNTER (OUTPATIENT)
Dept: PAIN MANAGEMENT | Age: 67
Discharge: HOME | End: 2022-11-15
Attending: ANESTHESIOLOGY

## 2022-11-15 ENCOUNTER — HOSPITAL ENCOUNTER (OUTPATIENT)
Dept: GENERAL RADIOLOGY | Age: 67
Discharge: HOME | End: 2022-11-15
Attending: ANESTHESIOLOGY

## 2022-11-15 VITALS
RESPIRATION RATE: 16 BRPM | TEMPERATURE: 99 F | DIASTOLIC BLOOD PRESSURE: 93 MMHG | HEART RATE: 73 BPM | SYSTOLIC BLOOD PRESSURE: 151 MMHG | OXYGEN SATURATION: 97 %

## 2022-11-15 DIAGNOSIS — R52 PAIN: ICD-10-CM

## 2022-11-15 DIAGNOSIS — M46.1 SACROILIITIS (CMD): ICD-10-CM

## 2022-11-15 PROCEDURE — 27096 INJECT SACROILIAC JOINT: CPT | Performed by: ANESTHESIOLOGY

## 2022-11-15 PROCEDURE — 13000067 HB PAIN MANAGEMENT GROUP 2

## 2022-11-15 PROCEDURE — 76000 FLUOROSCOPY <1 HR PHYS/QHP: CPT

## 2022-11-15 RX ORDER — BUPIVACAINE HYDROCHLORIDE 2.5 MG/ML
3 INJECTION, SOLUTION EPIDURAL; INFILTRATION; INTRACAUDAL ONCE
Status: DISCONTINUED | OUTPATIENT
Start: 2022-11-15 | End: 2022-11-17 | Stop reason: HOSPADM

## 2022-11-15 RX ORDER — METHYLPREDNISOLONE ACETATE 80 MG/ML
80 INJECTION, SUSPENSION INTRA-ARTICULAR; INTRALESIONAL; INTRAMUSCULAR; SOFT TISSUE ONCE
Status: DISCONTINUED | OUTPATIENT
Start: 2022-11-15 | End: 2022-11-17 | Stop reason: HOSPADM

## 2022-11-15 ASSESSMENT — PAIN SCALES - GENERAL
PAINLEVEL_OUTOF10: 0
PAINLEVEL_OUTOF10: 2

## 2022-11-22 DIAGNOSIS — M47.816 LUMBAR SPONDYLOSIS: ICD-10-CM

## 2022-11-22 RX ORDER — CELECOXIB 200 MG/1
200 CAPSULE ORAL DAILY
Qty: 30 CAPSULE | Refills: 3 | Status: SHIPPED | OUTPATIENT
Start: 2022-11-22 | End: 2022-12-16 | Stop reason: SDUPTHER

## 2022-12-16 ENCOUNTER — OFFICE VISIT (OUTPATIENT)
Dept: PAIN MANAGEMENT | Age: 67
End: 2022-12-16

## 2022-12-16 VITALS
HEIGHT: 72 IN | DIASTOLIC BLOOD PRESSURE: 96 MMHG | BODY MASS INDEX: 26.96 KG/M2 | SYSTOLIC BLOOD PRESSURE: 159 MMHG | WEIGHT: 199.08 LBS

## 2022-12-16 DIAGNOSIS — M46.1 SACROILIITIS, NOT ELSEWHERE CLASSIFIED (CMD): Primary | ICD-10-CM

## 2022-12-16 DIAGNOSIS — M47.816 LUMBAR SPONDYLOSIS: ICD-10-CM

## 2022-12-16 PROCEDURE — 99214 OFFICE O/P EST MOD 30 MIN: CPT | Performed by: ANESTHESIOLOGY

## 2022-12-16 RX ORDER — CYCLOBENZAPRINE HCL 10 MG
10 TABLET ORAL
Qty: 90 TABLET | Refills: 1 | Status: SHIPPED | OUTPATIENT
Start: 2022-12-16 | End: 2023-02-06 | Stop reason: SDUPTHER

## 2022-12-16 RX ORDER — CELECOXIB 200 MG/1
200 CAPSULE ORAL DAILY
Qty: 90 CAPSULE | Refills: 2 | Status: SHIPPED | OUTPATIENT
Start: 2022-12-16 | End: 2023-03-16

## 2022-12-16 ASSESSMENT — PAIN SCALES - GENERAL: PAINLEVEL: 2

## 2022-12-16 ASSESSMENT — ENCOUNTER SYMPTOMS: BACK PAIN: 1

## 2023-01-31 DIAGNOSIS — M47.816 LUMBAR SPONDYLOSIS: ICD-10-CM

## 2023-01-31 RX ORDER — CYCLOBENZAPRINE HCL 10 MG
10 TABLET ORAL
Qty: 90 TABLET | Refills: 1 | OUTPATIENT
Start: 2023-01-31 | End: 2023-05-01

## 2023-02-06 DIAGNOSIS — M47.816 LUMBAR SPONDYLOSIS: ICD-10-CM

## 2023-02-06 RX ORDER — CYCLOBENZAPRINE HCL 10 MG
10 TABLET ORAL
Qty: 90 TABLET | Refills: 1 | Status: CANCELLED | OUTPATIENT
Start: 2023-02-06 | End: 2023-05-07

## 2023-02-06 RX ORDER — CYCLOBENZAPRINE HCL 10 MG
10 TABLET ORAL
Qty: 90 TABLET | Refills: 1 | Status: SHIPPED | OUTPATIENT
Start: 2023-02-06 | End: 2023-05-07

## 2024-04-15 NOTE — DISCHARGE INSTRUCTIONS
Limit lifting right arm above shoulder for 3 days  May shower beginning 4/19/2024     HOME INSTRUCTIONS  [facility] HOME CARE INSTRUCTIONS: POST-OP ANESTHESIA  The medication that you received for sedation or general anesthesia can last up to 24 hours. Your judgment and reflexes may be altered, even if you feel like your normal self.      We Recommend:   Do not drive any motor vehicle or bicycle   Avoid mowing the lawn, playing sports, or working with power tools/applicances (power saws, electric knives or mixers)   That you have someone stay with you on your first night home   Do not drink alcohol or take sleeping pills or tranquilizers   Do not sign legal documents within 24 hours of your procedure   If you had a nerve block for your surgery, take extra care not to put any pressure on your arm or hand for 24 hours    It is normal:  For you to have a sore throat if you had a breathing tube during surgery (while you were asleep!). The sore throat should get better within 48 hours. You can gargle with warm salt water (1/2 tsp in 4 oz warm water) or use a throat lozenge for comfort  To feel muscle aches or soreness especially in the abdomen, chest or neck. The achy feeling should go away in the next 24 hours  To feel weak, sleepy or \"wiped out\". Your should start feeling better in the next 24 hours.   To experience mild discomforts such as sore lip or tongue, headache, cramps, gas pains or a bloated feeling in your abdomen.   To experience mild back pain or soreness for a day or two if you had spinal or epidural anesthesia.   If you had laparoscopic surgery, to feel shoulder pain or discomfort on the day of surgery.   For some patients to have nausea after surgery/anesthesia    If you feel nausea or experience vomiting:   Try to move around less.   Eat less than usual or drink only liquids until the next morning   Nausea should resolve in about 24 hours    If you have a problem when you are at home:    Call your surgeons  office   Discharge Instructions: After Your Surgery  You’ve just had surgery. During surgery, you were given medicine called anesthesia to keep you relaxed and free of pain. After surgery, you may have some pain or nausea. This is common. Here are some tips for feeling better and getting well after surgery.   Going home  Your healthcare provider will show you how to take care of yourself when you go home. They'll also answer your questions. Have an adult family member or friend drive you home. For the first 24 hours after your surgery:   Don't drive or use heavy equipment.  Don't make important decisions or sign legal papers.  Take medicines as directed.  Don't drink alcohol.  Have someone stay with you, if needed. They can watch for problems and help keep you safe.  Be sure to go to all follow-up visits with your healthcare provider. And rest after your surgery for as long as your provider tells you to.   Coping with pain  If you have pain after surgery, pain medicine will help you feel better. Take it as directed, before pain becomes severe. Also, ask your healthcare provider or pharmacist about other ways to control pain. This might be with heat, ice, or relaxation. And follow any other instructions your surgeon or nurse gives you.      Stay on schedule with your medicine.     Tips for taking pain medicine  To get the best relief possible, remember these points:   Pain medicines can upset your stomach. Taking them with a little food may help.  Most pain relievers taken by mouth need at least 20 to 30 minutes to start to work.  Don't wait till your pain becomes severe before you take your medicine. Try to time your medicine so that you can take it before starting an activity. This might be before you get dressed, go for a walk, or sit down for dinner.  Constipation is a common side effect of some pain medicines. Call your healthcare provider before taking any medicines such as laxatives or stool softeners to help  ease constipation. Also ask if you should skip any foods. Drinking lots of fluids and eating foods such as fruits and vegetables that are high in fiber can also help. Remember, don't take laxatives unless your surgeon has prescribed them.  Drinking alcohol and taking pain medicine can cause dizziness and slow your breathing. It can even be deadly. Don't drink alcohol while taking pain medicine.  Pain medicine can make you react more slowly to things. Don't drive or run machinery while taking pain medicine.  Your healthcare provider may tell you to take acetaminophen to help ease your pain. Ask them how much you're supposed to take each day. Acetaminophen or other pain relievers may interact with your prescription medicines or other over-the-counter (OTC) medicines. Some prescription medicines have acetaminophen and other ingredients in them. Using both prescription and OTC acetaminophen for pain can cause you to accidentally overdose. Read the labels on your OTC medicines with care. This will help you to clearly know the list of ingredients, how much to take, and any warnings. It may also help you not take too much acetaminophen. If you have questions or don't understand the information, ask your pharmacist or healthcare provider to explain it to you before you take the OTC medicine.   Managing nausea  Some people have an upset stomach (nausea) after surgery. This is often because of anesthesia, pain, or pain medicine, less movement of food in the stomach, or the stress of surgery. These tips will help you handle nausea and eat healthy foods as you get better. If you were on a special food plan before surgery, ask your healthcare provider if you should follow it while you get better. Check with your provider on how your eating should progress. It may depend on the surgery you had. These general tips may help:   Don't push yourself to eat. Your body will tell you when to eat and how much.  Start off with clear  liquids and soup. They're easier to digest.  Next try semi-solid foods as you feel ready. These include mashed potatoes, applesauce, and gelatin.  Slowly move to solid foods. Don’t eat fatty, rich, or spicy foods at first.  Don't force yourself to have 3 large meals a day. Instead eat smaller amounts more often.  Take pain medicines with a small amount of solid food, such as crackers or toast. This helps prevent nausea.  When to call your healthcare provider  Call your healthcare provider right away if you have any of these:   You still have too much pain, or the pain gets worse, after taking the medicine. The medicine may not be strong enough. Or there may be a complication from the surgery.  You feel too sleepy, dizzy, or groggy. The medicine may be too strong.  Side effects such as nausea or vomiting. Your healthcare provider may advise taking other medicines to .  Skin changes such as rash, itching, or hives. This may mean you have an allergic reaction. Your provider may advise taking other medicines.  The incision looks different (for instance, part of it opens up).  Bleeding or fluid leaking from the incision site, and weren't told to expect that.  Fever of 100.4°F (38°C) or higher, or as directed by your provider.  Call 911  Call 911 right away if you have:   Trouble breathing  Facial swelling    If you have obstructive sleep apnea   You were given anesthesia medicine during surgery to keep you comfortable and free of pain. After surgery, you may have more apnea spells because of this medicine and other medicines you were given. The spells may last longer than normal.    At home:  Keep using the continuous positive airway pressure (CPAP) device when you sleep. Unless your healthcare provider tells you not to, use it when you sleep, day or night. CPAP is a common device used to treat obstructive sleep apnea.  Talk with your provider before taking any pain medicine, muscle relaxants, or sedatives. Your provider  will tell you about the possible dangers of taking these medicines.  Contact your provider if your sleeping changes a lot even when taking medicines as directed.  Bet[employer] last reviewed this educational content on 10/1/2021  © 3876-2041 [employer]. All rights reserved. This information is not intended as a substitute for professional medical care. Always follow your healthcare professional's instructions.

## 2024-04-18 ENCOUNTER — ANESTHESIA EVENT (OUTPATIENT)
Dept: SURGERY | Facility: HOSPITAL | Age: 69
End: 2024-04-18
Payer: MEDICARE

## 2024-04-18 ENCOUNTER — ANESTHESIA (OUTPATIENT)
Dept: SURGERY | Facility: HOSPITAL | Age: 69
End: 2024-04-18
Payer: MEDICARE

## 2024-04-18 ENCOUNTER — HOSPITAL ENCOUNTER (OUTPATIENT)
Facility: HOSPITAL | Age: 69
Setting detail: HOSPITAL OUTPATIENT SURGERY
Discharge: HOME | End: 2024-04-18
Attending: COLON & RECTAL SURGERY | Admitting: COLON & RECTAL SURGERY
Payer: MEDICARE

## 2024-04-18 ENCOUNTER — APPOINTMENT (OUTPATIENT)
Dept: GENERAL RADIOLOGY | Facility: HOSPITAL | Age: 69
End: 2024-04-18
Attending: COLON & RECTAL SURGERY
Payer: MEDICARE

## 2024-04-18 VITALS
RESPIRATION RATE: 12 BRPM | HEART RATE: 72 BPM | HEIGHT: 71 IN | SYSTOLIC BLOOD PRESSURE: 152 MMHG | DIASTOLIC BLOOD PRESSURE: 94 MMHG | WEIGHT: 186 LBS | OXYGEN SATURATION: 95 % | BODY MASS INDEX: 26.04 KG/M2 | TEMPERATURE: 98 F

## 2024-04-18 PROCEDURE — B5181ZA FLUOROSCOPY OF SUPERIOR VENA CAVA USING LOW OSMOLAR CONTRAST, GUIDANCE: ICD-10-PCS | Performed by: COLON & RECTAL SURGERY

## 2024-04-18 PROCEDURE — 0JH63WZ INSERTION OF TOTALLY IMPLANTABLE VASCULAR ACCESS DEVICE INTO CHEST SUBCUTANEOUS TISSUE AND FASCIA, PERCUTANEOUS APPROACH: ICD-10-PCS | Performed by: COLON & RECTAL SURGERY

## 2024-04-18 PROCEDURE — 77001 FLUOROGUIDE FOR VEIN DEVICE: CPT | Performed by: COLON & RECTAL SURGERY

## 2024-04-18 PROCEDURE — 02HV33Z INSERTION OF INFUSION DEVICE INTO SUPERIOR VENA CAVA, PERCUTANEOUS APPROACH: ICD-10-PCS | Performed by: COLON & RECTAL SURGERY

## 2024-04-18 DEVICE — POWERPORT ISP M.R.I. IMPLANTABLE PORT WITH ATTACHABLE 8F CHRONOFLEX OPEN-ENDED SINGLE-LUMEN VENOUS CATHETER INTERMEDIATE KIT (WITH SUTURE-PLUGS)
Type: IMPLANTABLE DEVICE | Site: CHEST | Status: FUNCTIONAL
Brand: POWERPORT M.R.I., CHRONOFLEX

## 2024-04-18 RX ORDER — CEFAZOLIN SODIUM/WATER 2 G/20 ML
2 SYRINGE (ML) INTRAVENOUS ONCE
Status: COMPLETED | OUTPATIENT
Start: 2024-04-18 | End: 2024-04-18

## 2024-04-18 RX ORDER — DEXAMETHASONE SODIUM PHOSPHATE 4 MG/ML
VIAL (ML) INJECTION AS NEEDED
Status: DISCONTINUED | OUTPATIENT
Start: 2024-04-18 | End: 2024-04-18 | Stop reason: SURG

## 2024-04-18 RX ORDER — MORPHINE SULFATE 10 MG/ML
6 INJECTION, SOLUTION INTRAMUSCULAR; INTRAVENOUS EVERY 10 MIN PRN
Status: DISCONTINUED | OUTPATIENT
Start: 2024-04-18 | End: 2024-04-18

## 2024-04-18 RX ORDER — HYDROMORPHONE HYDROCHLORIDE 1 MG/ML
0.2 INJECTION, SOLUTION INTRAMUSCULAR; INTRAVENOUS; SUBCUTANEOUS EVERY 5 MIN PRN
Status: DISCONTINUED | OUTPATIENT
Start: 2024-04-18 | End: 2024-04-18

## 2024-04-18 RX ORDER — HYDROMORPHONE HYDROCHLORIDE 1 MG/ML
0.4 INJECTION, SOLUTION INTRAMUSCULAR; INTRAVENOUS; SUBCUTANEOUS EVERY 5 MIN PRN
Status: DISCONTINUED | OUTPATIENT
Start: 2024-04-18 | End: 2024-04-18

## 2024-04-18 RX ORDER — LIDOCAINE HYDROCHLORIDE 10 MG/ML
INJECTION, SOLUTION EPIDURAL; INFILTRATION; INTRACAUDAL; PERINEURAL AS NEEDED
Status: DISCONTINUED | OUTPATIENT
Start: 2024-04-18 | End: 2024-04-18 | Stop reason: SURG

## 2024-04-18 RX ORDER — ONDANSETRON 2 MG/ML
4 INJECTION INTRAMUSCULAR; INTRAVENOUS EVERY 6 HOURS PRN
Status: DISCONTINUED | OUTPATIENT
Start: 2024-04-18 | End: 2024-04-18

## 2024-04-18 RX ORDER — MORPHINE SULFATE 4 MG/ML
4 INJECTION, SOLUTION INTRAMUSCULAR; INTRAVENOUS EVERY 10 MIN PRN
Status: DISCONTINUED | OUTPATIENT
Start: 2024-04-18 | End: 2024-04-18

## 2024-04-18 RX ORDER — SODIUM CHLORIDE, SODIUM LACTATE, POTASSIUM CHLORIDE, CALCIUM CHLORIDE 600; 310; 30; 20 MG/100ML; MG/100ML; MG/100ML; MG/100ML
INJECTION, SOLUTION INTRAVENOUS CONTINUOUS
Status: DISCONTINUED | OUTPATIENT
Start: 2024-04-18 | End: 2024-04-18

## 2024-04-18 RX ORDER — HYDROMORPHONE HYDROCHLORIDE 1 MG/ML
0.6 INJECTION, SOLUTION INTRAMUSCULAR; INTRAVENOUS; SUBCUTANEOUS EVERY 5 MIN PRN
Status: DISCONTINUED | OUTPATIENT
Start: 2024-04-18 | End: 2024-04-18

## 2024-04-18 RX ORDER — MORPHINE SULFATE 4 MG/ML
2 INJECTION, SOLUTION INTRAMUSCULAR; INTRAVENOUS EVERY 10 MIN PRN
Status: DISCONTINUED | OUTPATIENT
Start: 2024-04-18 | End: 2024-04-18

## 2024-04-18 RX ORDER — NALOXONE HYDROCHLORIDE 0.4 MG/ML
0.08 INJECTION, SOLUTION INTRAMUSCULAR; INTRAVENOUS; SUBCUTANEOUS AS NEEDED
Status: DISCONTINUED | OUTPATIENT
Start: 2024-04-18 | End: 2024-04-18

## 2024-04-18 RX ORDER — METOCLOPRAMIDE HYDROCHLORIDE 5 MG/ML
10 INJECTION INTRAMUSCULAR; INTRAVENOUS EVERY 8 HOURS PRN
Status: DISCONTINUED | OUTPATIENT
Start: 2024-04-18 | End: 2024-04-18

## 2024-04-18 RX ORDER — ONDANSETRON 2 MG/ML
INJECTION INTRAMUSCULAR; INTRAVENOUS AS NEEDED
Status: DISCONTINUED | OUTPATIENT
Start: 2024-04-18 | End: 2024-04-18 | Stop reason: SURG

## 2024-04-18 RX ORDER — BUPIVACAINE HYDROCHLORIDE 5 MG/ML
INJECTION, SOLUTION EPIDURAL; INTRACAUDAL AS NEEDED
Status: DISCONTINUED | OUTPATIENT
Start: 2024-04-18 | End: 2024-04-18 | Stop reason: HOSPADM

## 2024-04-18 RX ORDER — LIDOCAINE HYDROCHLORIDE 10 MG/ML
INJECTION, SOLUTION EPIDURAL; INFILTRATION; INTRACAUDAL; PERINEURAL AS NEEDED
Status: DISCONTINUED | OUTPATIENT
Start: 2024-04-18 | End: 2024-04-18 | Stop reason: HOSPADM

## 2024-04-18 RX ORDER — ACETAMINOPHEN 500 MG
1000 TABLET ORAL ONCE
Status: COMPLETED | OUTPATIENT
Start: 2024-04-18 | End: 2024-04-18

## 2024-04-18 RX ADMIN — ONDANSETRON 4 MG: 2 INJECTION INTRAMUSCULAR; INTRAVENOUS at 12:00:00

## 2024-04-18 RX ADMIN — DEXAMETHASONE SODIUM PHOSPHATE 4 MG: 4 MG/ML VIAL (ML) INJECTION at 12:00:00

## 2024-04-18 RX ADMIN — CEFAZOLIN SODIUM/WATER 2 G: 2 G/20 ML SYRINGE (ML) INTRAVENOUS at 11:56:00

## 2024-04-18 RX ADMIN — SODIUM CHLORIDE, SODIUM LACTATE, POTASSIUM CHLORIDE, CALCIUM CHLORIDE: 600; 310; 30; 20 INJECTION, SOLUTION INTRAVENOUS at 13:04:00

## 2024-04-18 RX ADMIN — LIDOCAINE HYDROCHLORIDE 30 MG: 10 INJECTION, SOLUTION EPIDURAL; INFILTRATION; INTRACAUDAL; PERINEURAL at 11:45:00

## 2024-04-18 NOTE — ANESTHESIA PREPROCEDURE EVALUATION
Anesthesia PreOp Note    HPI:     Joaquín Casarez is a 69 year old male who presents for preoperative consultation requested by: Dwight Rollins MD    Date of Surgery: 4/18/2024    Procedure(s):  Placement of port a catheter under fluoroscopy  Indication: Rectal cancer    Relevant Problems   No relevant active problems       NPO:  Last Liquid Consumption Date: 04/17/24  Last Liquid Consumption Time: 2130  Last Solid Consumption Date: 04/17/24  Last Solid Consumption Time: 2000  Last Liquid Consumption Date: 04/17/24          History Review:  Patient Active Problem List    Diagnosis Date Noted    Vitamin D deficiency     Lymphocytic colitis     DEVON (obstructive sleep apnea)     BPH associated with nocturia     Macrocytosis     Thoracic disc herniation 01/12/2022    History of lumbar laminectomy 01/12/2022    Arthritis of facet joint of lumbar spine 01/12/2022    History of rectal fissure 01/2022    History of gout     Spondylosis of thoracic region without myelopathy or radiculopathy 11/18/2020    IFG (impaired fasting glucose) 04/01/2020    Hypertension     Hyperlipidemia     Chronic low back pain        Past Medical History:    BPH associated with nocturia    Chronic low back pain    s/p LESI x3; history of lumbar spinal stenosis; lumbar laminectomy 2020    Colitis    Diverticulosis of large intestine    High blood pressure    High cholesterol    History of gout    History of rectal fissure    Treated by Dr. Dubin    Hyperlipidemia    Hypertension    IFG (impaired fasting glucose)    , new; repeat FBS and A1C pending.    Lymphocytic colitis    Sees Dr. Dubin, not currently symptomatic    Macrocytosis    4-5 alcoholic drinks per day, previously got B12 shots through a nutrition clinic    DEVON (obstructive sleep apnea)    AHI 59  REM AHI 0 Supine AHI 24 non-supine AHI 60 Sao2 Eliud 85%     Sleep apnea    Spondylosis of thoracic region without myelopathy or radiculopathy    Thoracic disc herniation     Vitamin D deficiency       Past Surgical History:   Procedure Laterality Date    Colonoscopy  12/06/2021    Lymphocytic colitis; Dr. Dubin    Laminectomy      L4    Laminectomy,lumbar      Vasectomy         Medications Prior to Admission   Medication Sig Dispense Refill Last Dose    BENZOYL PEROXIDE WASH 10 % External Liquid LATHER ON SCALP, LEAVE ON X 5 MIN AND THEN RINSE OFF ONCE DAILY       celecoxib 200 MG Oral Cap Take 1 capsule (200 mg total) by mouth daily.   4/17/2024 at 0800    AMLODIPINE 5 MG Oral Tab TAKE 1 TABLET BY MOUTH EVERY DAY IN THE MORNING 90 tablet 0 4/18/2024 at 0800    ALPRAZOLAM 0.5 MG Oral Tab TAKE 1 TABLET BY MOUTH EVERY 4 HOURS AS NEEDED 60 tablet 1 4/17/2024 at 2200    atorvastatin 20 MG Oral Tab Take 1 tablet (20 mg total) by mouth daily. 90 tablet 0 4/17/2024 at 0800    LOSARTAN 100 MG Oral Tab TAKE 1 TABLET BY MOUTH EVERY DAY IN THE MORNING 90 tablet 1 4/17/2024 at 0800    clindamycin 1 % External Solution Apply to affected area twice daily       allopurinol 300 MG Oral Tab Take 1 tablet (300 mg total) by mouth nightly. Take 1 tablet daily for gout prevention   4/16/2024    Fluticasone Propionate 50 MCG/ACT Nasal Suspension 2 sprays by Nasal route daily as needed.       cyclobenzaprine 10 MG Oral Tab Take 1 tablet (10 mg total) by mouth nightly as needed.   More than a month    colchicine 0.6 MG Oral Tab Take 1 tablet (0.6 mg total) by mouth as needed (gout).        Current Facility-Administered Medications Ordered in Epic   Medication Dose Route Frequency Provider Last Rate Last Admin    lactated ringers infusion   Intravenous Continuous Dwight Rollins MD 20 mL/hr at 04/18/24 1051 New Bag at 04/18/24 1051    ceFAZolin (Ancef) 2 g in 20mL IV syringe premix  2 g Intravenous Once Dwight Rollins MD         No current [facility]-ordered outpatient medications on file.       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             Vital Signs:  Body mass index is 25.94 kg/m².   height is 1.803 m (5' 11\") and weight is 84.4 kg (186 lb). His oral temperature is 98.2 °F (36.8 °C). His blood pressure is 149/86 and his pulse is 83. His respiration is 18 and oxygen saturation is 98%.   Vitals:    24 1409 24 1047   BP:  149/86   Pulse:  83   Resp:  18   Temp:  98.2 °F (36.8 °C)   TempSrc:  Oral   SpO2:  98%   Weight: 83.5 kg (184 lb) 84.4 kg (186 lb)   Height: 1.803 m (5' 11\") 1.803 m (5' 11\")        Anesthesia Evaluation      No history of anesthetic complications   Airway   Mallampati: II  TM distance: >3 FB  Neck ROM: full  Dental      Pulmonary     breath sounds clear to auscultation  (+) sleep apnea on CPAP  (-) COPD, asthma, decreased breath sounds, wheezes  Cardiovascular   Exercise tolerance: good  (+) hypertension  (-) pacemaker, murmur    Rhythm: regular  Rate: normal    Neuro/Psych    (-) seizures    GI/Hepatic/Renal    (-)  GERD    Endo/Other    (-) diabetes mellitus  Abdominal                  Anesthesia Plan:   ASA:  3  Plan:   General  Airway:  LMA  Post-op Pain Management: Oral pain medication and IV analgesics  Informed Consent Plan and Risks Discussed With:  Patient      I have informed Joaquín Casarez and/or legal guardian or family member of the nature of the anesthetic plan, benefits, risks including possible dental damage if relevant, major complications, and any alternative forms of anesthetic management.   All of the patient's questions were answered to the best of my ability. The patient desires the anesthetic management as planned.  Brennan Espino MD  4/18/2024 11:09 AM  Present on Admission:  **None**

## 2024-04-18 NOTE — H&P
[facility]  Preop H&P - Colon and Rectal Surgery  Dwight Rollins MD      CHIEF COMPLAINT: Patient presents for portacath placement for chemotherapy for rectal cancer       HISTORY OF PRESENT ILLNESS:  Joaquín Casarez is a 69 year old male who presents for evaluation of rectal cancer.    He has been noting intermittent anal pain, during and after BMs, in the last couple months.  He had a single episode of significant bleeding 4 months ago, passing mostly blood with very little stool.    He underwent colonoscopy with Dr. Dubin and a rectal cancer was found.  He reports having had a colonoscopy 2 years ago with Dr. Dubin at which time \"colitis\" was found and a \"decent sized polyp\" was removed.    Bowel habits: (On MiraLAX for 3 years following diagnosis of anal fissure)  Frequency: 3-5 times daily  Consistency: Formed, more narrow in last few months  Continence: Occasional incontinence    Completeness of Evacuation: Does not consistently feel completely emptied in the last few months    HISTORY:  Past Medical History:   Diagnosis Date   Anxiety   BPH associated with nocturia   Chronic low back pain   s/p LESI x3; history of lumbar spinal stenosis; lumbar laminectomy 2020   History of gout   History of rectal fissure 01/2022   Treated by Dr. Dubin   Hyperlipidemia   Hypertension   IFG (impaired fasting glucose) 04/2020   , new; repeat FBS and A1C pending.   Lymphocytic colitis   Sees Dr. Dubin, not currently symptomatic   Macrocytosis   4-5 alcoholic drinks per day, previously got B12 shots through a nutrition clinic   DEVON (obstructive sleep apnea) 9/1/2021 SN SS   AHI 59  REM AHI 0 Supine AHI 24 non-supine AHI 60 Sao2 Eliud 85%   Rectal cancer (HCC) 02/2024   distal rectal CA with adenopathy, sees Dubin in GI and Dr. Villasenor in oncology   Spondylosis of thoracic region without myelopathy or radiculopathy 11/18/2020   Thoracic disc herniation 01/12/2022   Vitamin D deficiency     Past Surgical  History:   Procedure Laterality Date   BACK SURGERY Right 2023   nerve ablation L3/L4   COLONOSCOPY 2021   Lymphocytic colitis; Dr. Dubin   COLONOSCOPY 2024   rectal mass, polyps, diverticulosis   LAMINECTOMY,LUMBAR 2020   VASECTOMY     Family History   Problem Relation Age of Onset   Breast Cancer Mother   Other (Alzheimer's disease) Mother 93   Heart Disease Father 84   Stroke Father   No Known Problems Sister   Heart Disease Brother 70   heavy smoker   Cancer Maternal Grandfather   unknown   Diabetes Maternal Aunt   Other (Celiac) Niece     Social History  Tobacco Use  Smoking status: Former  Packs/day: 1.5  Types: Cigarettes  Start date:   Quit date:   Years since quittin.2  Smokeless tobacco: Never  Tobacco comments: quit age 38  Vaping Use  Vaping Use: Never used  Alcohol use: Yes  Alcohol/week: 28.0 standard drinks of alcohol  Types: 28 Standard drinks or equivalent per week  Drug use: Yes  Types: Cannabis  Comment: cbd tincture      CURRENT MEDICATIONS:   Current Outpatient Medications   Medication Sig Dispense Refill   losartan 100 MG Oral Tab Take 1 tablet (100 mg total) by mouth every morning. 90 tablet 0   atorvastatin 20 MG Oral Tab Take 1 tablet (20 mg total) by mouth daily. 90 tablet 3   AMLODIPINE 5 MG Oral Tab TAKE ONE TABLET BY MOUTH IN THE MORNING 90 tablet 0   ALPRAZOLAM 0.5 MG Oral Tab TAKE ONE TABLET BY MOUTH EVERY FOUR HOURS AS NEEDED 60 tablet 0   allopurinol 300 MG Oral Tab TAKE ONE TABLET BY MOUTH NIGHTLY FOR GOUT PREVENTION 90 tablet 3   budesonide 3 MG Oral Cap DR Particles Take 3 capsules (9 mg total) by mouth every morning. 270 capsule 0   ALPRAZolam 0.5 MG Oral Tab Take 30 to 60 minutes prior to MRI 1 tablet 0   Na Sulfate-K Sulfate-Mg Sulf (SUPREP BOWEL PREP KIT) 17.5-3.13-1.6 GM/177ML Oral Solution Take as prescribed by provider. 1 each 0   celecoxib 200 MG Oral Cap TAKE ONE CAPSULE BY MOUTH DAILY AS NEEDED FOR PAIN 90 capsule 1   cyclobenzaprine  5 MG Oral Tab   ibuprofen 800 MG Oral Tab   Ciclopirox Olamine 0.77 % External Cream APPLY TO THE AFFECTED AREA TWICE DAILY FOR 2 TO 4 WEEKS   triamcinolone 0.1 % External Cream Apply twice daily to rash up to 2 weeks.   diclofenac 1 % External Gel Apply 2 g topically 4 (four) times daily. 100 g 0   BENZOYL PEROXIDE WASH 10 % External Liquid LATHER ON SCALP, LEAVE ON X 5 MIN AND THEN RINSE OFF ONCE DAILY   colchicine 0.6 MG Oral Tab Take 0.6 mg by mouth as needed (gout).   clindamycin 1 % External Solution Apply to affected area twice daily   Fluticasone Propionate 50 MCG/ACT Nasal Suspension 2 sprays by Nasal route daily as needed.         ALLERGIES:  Patient has no known allergies.    REVIEW OF SYSTEMS:  Constitutional: normal  Eyes: normal  Ears/Nose/Throat: normal  Respiratory: normal  Cardiac/Vascular: normal  GI: See HPI  : normal  Musculoskeletal: Arthritis, back pain  Skin: normal  Neurologic: normal  Psychiatric: normal  Endocrine: normal  Hematology/Lymphatics: normal  Allergy/Immunology: normal    PHYSICAL EXAM:   Ht 5' 11\" (1.803 m)  Wt 181 lb (82.1 kg)  BMI 25.24 kg/m²   Body mass index is 25.24 kg/m².    CONSTITUTIONAL: awake, alert, cooperative, no apparent distress, and appears stated age  EYES: Lids and lashes normal, sclera clear, conjunctiva normal  ENT: Normocephalic, without obvious abnormality, atraumatic  NECK: Supple, symmetrical, trachea midline, no adenopathy, thyroid symmetric, not enlarged and no tenderness  HEMATOLOGIC/LYMPHATICS: No cervical lymphadenopathy, no supraclavicular lymphadenopathy, no inguinal lymphadenopathy  LUNGS: No increased work of breathing, good air exchange, clear to auscultation bilaterally, no crackles or wheezing  CARDIOVASCULAR: Normal apical impulse, regular rate and rhythm, and no murmur noted, no pedal edema  ABDOMEN: No scars, normal bowel sounds, soft, non-distended, non-tender, no masses palpated, no hepatosplenomegaly    RECTAL: (Examined left lateral  position)  External skin is normal  Anal tags are not noted  Prolapse is not noted with straining  Resting tone is normal, 3/5  Squeeze tone is normal, 3/5  Mass is palpable  One third circumference, anterior to right posterior, primarily based over prostate  Begins at anorectal junction  Limited mobility    GENITOURINARY:  Limited evaluation of prostate with overlying mass    MUSCULOSKELETAL: Full range of motion noted. Motor strength is 5 out of 5 all extremities bilaterally.   SKIN: no rashes and no jaundice  PSYCHIATRIC:   Orientation: normal  Appearance: normal  Behavior: normal  Attitude toward examiner: normal  Affect: normal  Judgment: Normal    DATA:   MRI Rectum 3/29/2024  Tumor Location:   Tumor location from the anal verge: (Low, 0-5.0 cm): 2.8 cm.   Distance from anal canal (top of anal sphincter): 0 cm   Relationship to anterior peritoneal reflection: Below     Tumor Characteristics:   Circumferential extent and location: Approximately 80% circumferential wall thickening of the low   rectal canal, with sparing of the right posterolateral wall .   Craniocaudal extent: 6.9 cm   Mucinous component: No     T-stage:     Local Extension: T4b: Tumor directly invades or is adherent to other organs or structures. There is   also normal fat plane between the anterior rectum and the mid gland prostate with possible invasion   (701/28). No definite involvement of the external sphincter.   Circumferential resection margin: 0.3 cm (701/33).     Extramural lymphovascular invasion: No.   Lymphadenopathy: Multiple enlarged pelvic lymph nodes are noted a right mesorectal lymph node   measures 1.2 x 1.1 cm (701/47). Left mesorectal lymph node measures 0.5 cm (701/48). Presacral lymph   node measures 0.7 cm (701/46).   Of note, previously seen hypermetabolic lymph nodes in the lower abdomen are not included in the   field-of-view.     Urogenital Structures: Multiple bladder diverticula noted. Abutment of the posterior  peripheral zone   prostate by the rectal mass as described above.   Skeleton: Within normal limits.   Other: Trace free fluid.     =====   IMPRESSION:   * Category: T4b N+, with involvement of the posterior peripheral zone prostate. No external   sphincteric involvement.   *  Suspicious extra mesorectal lymph nodes: Yes   *  EVMI: No         PET/CT 3/25/2024  =====   IMPRESSION:   * Hypermetabolic rectal mass, in keeping with known malignancy.   *  Hypermetabolic pelvic, lower abdominal and left supraclavicular lymph nodes, consistent with   earl metastasis.         CT C/A/P 3/11/2024  FINDINGS:     PULMONARY PARENCHYMA: Central airways are clear. Accessory left minor fissure noted. 0.3 cmsuperior segment right lower lobe nodule (3/127).    FINDINGS CT ABDOMEN/PELVIS:  LIVER: The liver is normal in size. There is no intrahepatic mass.    KIDNEYS: There are bilateral symmetric nephrograms without hydronephrosis. Small low density foci in the kidneys are too small to characterize.    LYMPH NODES:  Abdomen: Few subcentimeter retroperitoneal lymph nodes are noted, nonenlarged by CT size criteria.    Pelvis: Multiple enlarged mesorectal lymph nodes, a reference lymph node at the level of the lesion described below measures 0.9 cm (4/223). Rounded presacral lymph node measures 0.7 cm (4/209).  Soft tissue mass at the aortic bifurcation with coarse internal calcification measures 5.1 x 3.1 cm (4/189), likely lymph node. This results in mass effect on the left common iliac vein.    GI TRACT: There is no bowel obstruction. There is circumferential, irregular wall thickening of the lower third of the rectum, below the peritoneal reflection, approximately 3 cm from the anal verge, and measuring 4.8 cm in craniocaudal length. [place] protrusion along the left anterior rectal wall at the approximate 2:00 position (4/228), in close approximation to the mesorectal fascia. Colonic diverticulosis without evidence of  diverticulitis. Normal appendix.    PELVIC UROGENITAL STRUCTURES:Mild wall thickening of the urinary bladder with associated few diverticula. The prostate is present.    BODY WALL: Patulous left inguinal canal. Diastases recti. Multilevel degenerative changes of the spine. L5 pars defects with trace anterolisthesis of L5 on S1.    Impression     IMPRESSION:  1. Circumferential wall thickening of the low rectum, concerning for malignancy.    2. Multiple mesorectal and low pelvic lymph nodes, with dominant 5 cm mass at the aortic bifurcation, compatible with metastasis.    3. Nonspecific pulmonary micronodule. If prior imaging is available, this can be compared for stability. Otherwise, 3-6 months CT chest follow-up is recommended.    4. Extensive coronary artery atherosclerosis.     CEA   3/07/2024 - 15.6      COLONOSCOPY 3/07/2024 - Dr. M Dubin  PREOPERATIVE DIAGNOSIS:   Distal rectal stricture  Rectal pain  FINDINGS:  Normal perianal area.  Digital examination with large, firm, rectal mass.  Firm, nodular mass from the anal verge to 5 cm from the anus encompassing 75% of the rectal circumference. Spontaneous bleeding and friability was present. Biopsies were collected.  5 mm sessile proximal rectal polyp. Removed with cold snare polypectomy.  15 mm sessile and centrally umbilicated distal sigmoid colon polyp. Elevated with 2 mL normal saline and removed with hot snare polypectomy. This was located 20 cm from the anus.  6 mm and 8 mm sessile proximal transverse colon polyps. Removed with cold snare polypectomy.  5 mm sessile cecal polyp. Removed with cold snare polypectomy.  Moderate small and medium opening diverticulosis from proximal transverse colon through the sigmoid colon.    RECOMMENDATIONS:   Await pathology results.  Obtain CBC, CMP, and CEA levels.  CT scan of chest, abdomen, and pelvis.  Refer to oncology.  Refer to colorectal surgery.    Pathology  Final Diagnosis   A. Proximal rectal polyp,  polypectomy:  -Tubular adenoma.    B. Cecum, polyp, polypectomy:  -Fragments of sessile serrated polyp.    C. Transverse colon, polyps, polypectomy:  -Fragments of sessile serrated polyps.    D. Sigmoid colon, polyp, polypectomy:  -Sessile serrated polyp.    E. Distal rectal mass, biopsies:  -Positive for invasive moderately-differentiated adenocarcinoma. (See comment)       COLONOSCOPY 12/06/2021 - Dr. Dubin      Final Diagnosis:   A. Colon, biopsies:   Lymphocytic colitis.     B. Colon, sigmoid, polyp, biopsy:   Tubulovillous adenoma. No high-grade dysplasia identified.     ASSESSMENT AND PLAN:  Rectal cancer  Vascular access for chemotherapy needed    Proceed with Portacath placement    Procedure, indications, risks, benefits, and alternatives discussed with patient and S.O Aminata. All questions answered. They understand and she wishes to proceed.        Prior A/P  Rectal cancer  CT suggests stage III disease with mesorectal and periaortic (bifurcation) lymphadenopathy  Mass is distal, beginning at anorectal junction and overlying area of prostate  Locoregional staging has not yet been arranged  He is scheduled to see Dr. Villasenor in medical oncology later today.    History of colon adenoma  Pedunculated polyp in sigmoid colon    History of lymphocytic colitis    Will need to arrange local regional staging with either MRI or endoscopic ultrasound.  -MRI has the advantage of best assessment of plane between the mass and prostate as well as pelvic floor muscle involvement.  -EUS has the advantage of possible biopsy of mass at the aortic bifurcation if necessary.  Will be available to discuss imaging options with Dr. Villasenor once he has met the patient.    We discussed the distal nature of the mass.  We also discussed the current findings of limited mobility and likely mesorectal/periaortic lymphadenopathy which, if confirmed with formal local regional staging, will require neoadjuvant treatment followed by formal  resection rather than transanal excision.   We discussed options of sphincter salvage, which I do not believe are technically feasible.   Thus, he will need APR with permanent colostomy.   If periaortic lymphadenectomy is indicated, he will need laparotomy.  However, if he were to achieve a complete response with (total?) neoadjuvant treatment, he may be a candidate for a watch and wait approach.     We also discussed the option of obtaining a second surgical opinion as to the feasibility of sphincter salvage.  If he chooses to pursue this, I suggested that he consult with either Dr. Campbell at [place] or Dr. Bradley at [facility] and see them for evaluation prior to initiating neoadjuvant treatment.      The patient was educated regarding the condition, treatment options, and instructed in the above treatment plan.    Dwight Rollins MD, MD, FACS, FASCRS

## 2024-04-18 NOTE — DISCHARGE SUMMARY
Outpatient Surgery Brief Discharge Summary         Patient ID:  Joaquín Casarez  [id number]  69 year old  3/5/1955    Admission date:  04/18/24    Discharge date: 04/18/24    Discharge Diagnoses: Rectal cancer    Procedures: No discharge procedures on file.    Discharged Condition: stable    Disposition: home    Patient Instructions: Follow-up with Dwight Rollins MD in 1-2 weeks.    Diet: regular diet  Activity:   Limit lifting right arm above shoulder for 3 days  May shower beginning 4/19/2024       Dwight Rollins MD, FACS, FASCRS  4/18/2024  1:43 PM

## 2024-04-18 NOTE — ANESTHESIA PROCEDURE NOTES
Airway  Date/Time: 4/18/2024 11:48 AM  Urgency: elective      General Information and Staff    Patient location during procedure: OR  Anesthesiologist: Brennan Espino MD  Performed: anesthesiologist   Performed by: Brennan Espino MD  Authorized by: Brennan Espino MD      Indications and Patient Condition  Indications for airway management: anesthesia  Sedation level: deep  Preoxygenated: yes  Patient position: sniffing  Mask difficulty assessment: 1 - vent by mask    Final Airway Details  Final airway type: supraglottic airway      Successful airway: classic  Size 5       Number of attempts at approach: 1

## 2024-04-29 NOTE — OPERATIVE REPORT
Operative Note    Patient Name: Joaquín Casarez    Date of Surgery: 04/18/24     Preoperative Diagnosis: Rectal cancer    Postoperative Diagnosis: same    Primary Surgeon: Dwight Rollins MD    Assistant: SOPHIA LONGORIA    Procedures: Portacath placement, intraoperative fluoroscopy    Surgical Findings: Catheter tip at cavoatrial junction    Anesthesia: MAC    Complications: none    Implants: 8 Fr PowerPort single lumen regular profile MR compatible port    Specimen: none    Drains: none    Condition: good    Estimated Blood Loss: 10 mL      DESCRIPTION OF PROCEDURE    INDICATION   The patient is a 69 year old male with rectal cancer. He will be starting chemotherapy soon and is in need of long term venous access for infusion.    The procedure, indications, risks, benefits, and alternatives were discussed with the patient prior to the procedure. All questions were answered, and the patient wished to proceed.      TECHNIQUE   The patient was then taken to the operating room and placed on the operating table supine with appropriate attention to all joints and pressure points. Deep sedation was provided by anesthesia. Shoulder roll was placed and the chest and neck area was prepared bilaterally with Chloraprep and draped in the usual sterile fashion. Timeout was called to reconfirm the patient's identity, diagnosis, planned procedure, and completeness of preoperative preparations.     Local anesthetic was given over the right external jugular vein. A cutdown was made over the right external jugular vein. The vein was isolated and a silk tie was placed distally and tied. A second silk tie was placed proximally and used as a handle. Venotomy was created and the catheter threaded into the vein. The catheter initially did not pass beyond 10 cm and a wire with fluoroscopy was used. The wire passed into the left left subclavian vein several times before it ultimately threaded into the vena cava. The catheter was then advanced  and fluoroscopy showed the catheter tip in good position at the cavoatrial junction. The catheter and vein were secured by tying the proximal silk suture. He did not experience any arrhythmias during the procedure.    Additional local was provided and a tunnel and pocket created for the Port. 2-0 Prolene x 3 was used to secure the port. The catheter was secured to the port before fixing the port on the chest wall. The pocket was irrigated and the port cannulated through the skin. Hep saline flush confirmed easy aspiration and flushing without extravasation and the tip in proper position. The port sat in the pocket away from the incsion.    Both incisions were closed using 3-0 Vicryl deep layer and 4-0 Vicryl running subcuticular suture followed by Dermabond.    The patient was returned to the transportation cart and taken to the recovery room in satisfactory condition.     Sponge, Needle, Instrument counts were correct on 2 separate occasions at the end of the operation.     I was present throughout the entire operation.     _____________________________________   Attending Surgeon: Dwight Rollins MD   Dictated By: Dwight Rollins MD

## 2025-04-08 NOTE — ANESTHESIA POSTPROCEDURE EVALUATION
Patient: Joaquín Casarez    Procedure Summary       Date: 04/18/24 Room / Location: [facility] MAIN OR  / [facility] MAIN OR    Anesthesia Start: 1139 Anesthesia Stop: 1325    Procedure: Placement of port a catheter under fluoroscopy (Right: Chest) Diagnosis: (Rectal cancer)    Surgeons: Dwight Rollins MD Anesthesiologist: Brennan Espino MD    Anesthesia Type: general ASA Status: 3            Anesthesia Type: general    Vitals Value Taken Time   /94 04/18/24 1404   Temp 97.5 °F (36.4 °C) 04/18/24 1352   Pulse 72 04/18/24 1404   Resp 12 04/18/24 1404   SpO2 95 % 04/18/24 1404       [facility] AN Post Evaluation:   Patient Evaluated in PACU  Patient Participation: complete - patient participated  Level of Consciousness: awake and alert  Pain Management: adequate  Airway Patency:patent  Dental exam unchanged from preop  Yes    Nausea/Vomiting: none  Cardiovascular Status: hemodynamically stable  Respiratory Status: room air  Postoperative Hydration euvolemic      Brennan Espino MD  4/18/2024 4:34 PM  
(4) walks frequently

## (undated) DEVICE — 20 ML SYRINGE LUER-LOCK TIP: Brand: MONOJECT

## (undated) DEVICE — DRAPE,T,LAPARO,TRANS,STERILE: Brand: MEDLINE

## (undated) DEVICE — INTENDED FOR TISSUE SEPARATION, AND OTHER PROCEDURES THAT REQUIRE A SHARP SURGICAL BLADE TO PUNCTURE OR CUT.: Brand: BARD-PARKER ® STAINLESS STEEL BLADES

## (undated) DEVICE — GAMMEX® PI HYBRID SIZE 7.5, STERILE POWDER-FREE SURGICAL GLOVE, POLYISOPRENE AND NEOPRENE BLEND: Brand: GAMMEX

## (undated) DEVICE — E-Z CLEAN, NON-STICK, PTFE COATED, ELECTROSURGICAL NEEDLE ELECTRODE, MODIFIED EXTENDED INSULATION, 2.75 INCH (7 CM): Brand: MEGADYNE

## (undated) DEVICE — VIOLET BRAIDED (POLYGLACTIN 910), SYNTHETIC ABSORBABLE SUTURE: Brand: COATED VICRYL

## (undated) DEVICE — SOLUTION IRRIG 1000ML 0.9% NACL USP BTL

## (undated) DEVICE — MINOR GENERAL: Brand: MEDLINE INDUSTRIES, INC.

## (undated) DEVICE — SUT PERMA- 3-0 18IN NABSRB BLK TIE

## (undated) DEVICE — GUIDEWIRE URO L150CM DIA0.035IN TAPR 3CM NIT

## (undated) DEVICE — UNDYED BRAIDED (POLYGLACTIN 910), SYNTHETIC ABSORBABLE SUTURE: Brand: COATED VICRYL

## (undated) DEVICE — SUT PROL 2-0 30IN SH NABSRB BLU L26MM 1/2 CIR

## (undated) DEVICE — MEDI-VAC NON-CONDUCTIVE SUCTION TUBING: Brand: CARDINAL HEALTH

## (undated) DEVICE — YANKAUER,FLEXIBLE HANDLE,REGLR CAPACITY: Brand: MEDLINE INDUSTRIES, INC.

## (undated) DEVICE — 12 ML SYRINGE LUER-LOCK TIP: Brand: MONOJECT

## (undated) DEVICE — C-ARM: Brand: UNBRANDED